# Patient Record
Sex: FEMALE | Race: WHITE | NOT HISPANIC OR LATINO | Employment: OTHER | URBAN - METROPOLITAN AREA
[De-identification: names, ages, dates, MRNs, and addresses within clinical notes are randomized per-mention and may not be internally consistent; named-entity substitution may affect disease eponyms.]

---

## 2017-01-16 ENCOUNTER — COMMUNICATION - HEALTHEAST (OUTPATIENT)
Dept: FAMILY MEDICINE | Facility: CLINIC | Age: 72
End: 2017-01-16

## 2017-05-12 ENCOUNTER — OFFICE VISIT - HEALTHEAST (OUTPATIENT)
Dept: FAMILY MEDICINE | Facility: CLINIC | Age: 72
End: 2017-05-12

## 2017-05-12 DIAGNOSIS — M18.11 DEGENERATIVE ARTHRITIS OF THUMB, RIGHT: ICD-10-CM

## 2017-05-12 DIAGNOSIS — Z01.818 PREOP EXAMINATION: ICD-10-CM

## 2017-05-12 ASSESSMENT — MIFFLIN-ST. JEOR: SCORE: 964.43

## 2017-07-21 ENCOUNTER — HOSPITAL ENCOUNTER (OUTPATIENT)
Dept: MAMMOGRAPHY | Facility: HOSPITAL | Age: 72
Discharge: HOME | End: 2017-07-21
Attending: FAMILY MEDICINE

## 2017-07-21 DIAGNOSIS — Z12.31 VISIT FOR SCREENING MAMMOGRAM: ICD-10-CM

## 2017-07-27 ENCOUNTER — RECORDS - HEALTHEAST (OUTPATIENT)
Dept: ADMINISTRATIVE | Facility: OTHER | Age: 72
End: 2017-07-27

## 2017-08-23 ENCOUNTER — OFFICE VISIT - HEALTHEAST (OUTPATIENT)
Dept: FAMILY MEDICINE | Facility: CLINIC | Age: 72
End: 2017-08-23

## 2017-08-23 DIAGNOSIS — N95.9 MENOPAUSAL AND POSTMENOPAUSAL DISORDER: ICD-10-CM

## 2017-08-23 DIAGNOSIS — G43.909 MIGRAINE HEADACHE: ICD-10-CM

## 2017-08-23 DIAGNOSIS — R79.89 ELEVATED TSH: ICD-10-CM

## 2017-08-23 DIAGNOSIS — E78.00 PURE HYPERCHOLESTEROLEMIA: ICD-10-CM

## 2017-08-23 DIAGNOSIS — Z00.00 ROUTINE GENERAL MEDICAL EXAMINATION AT A HEALTH CARE FACILITY: ICD-10-CM

## 2017-08-23 LAB
CHOLEST SERPL-MCNC: 241 MG/DL
FASTING STATUS PATIENT QL REPORTED: YES
HDLC SERPL-MCNC: 68 MG/DL
LDLC SERPL CALC-MCNC: 139 MG/DL
TRIGL SERPL-MCNC: 172 MG/DL

## 2017-08-23 ASSESSMENT — MIFFLIN-ST. JEOR: SCORE: 953.77

## 2017-08-24 ENCOUNTER — COMMUNICATION - HEALTHEAST (OUTPATIENT)
Dept: FAMILY MEDICINE | Facility: CLINIC | Age: 72
End: 2017-08-24

## 2017-08-26 ENCOUNTER — COMMUNICATION - HEALTHEAST (OUTPATIENT)
Dept: FAMILY MEDICINE | Facility: CLINIC | Age: 72
End: 2017-08-26

## 2017-08-28 ENCOUNTER — RECORDS - HEALTHEAST (OUTPATIENT)
Dept: ADMINISTRATIVE | Facility: OTHER | Age: 72
End: 2017-08-28

## 2017-08-29 ENCOUNTER — COMMUNICATION - HEALTHEAST (OUTPATIENT)
Dept: FAMILY MEDICINE | Facility: CLINIC | Age: 72
End: 2017-08-29

## 2017-09-06 ENCOUNTER — COMMUNICATION - HEALTHEAST (OUTPATIENT)
Dept: FAMILY MEDICINE | Facility: CLINIC | Age: 72
End: 2017-09-06

## 2017-09-14 ENCOUNTER — COMMUNICATION - HEALTHEAST (OUTPATIENT)
Dept: FAMILY MEDICINE | Facility: CLINIC | Age: 72
End: 2017-09-14

## 2017-09-22 ENCOUNTER — AMBULATORY - HEALTHEAST (OUTPATIENT)
Dept: NURSING | Facility: CLINIC | Age: 72
End: 2017-09-22

## 2017-09-22 ENCOUNTER — AMBULATORY - HEALTHEAST (OUTPATIENT)
Dept: FAMILY MEDICINE | Facility: CLINIC | Age: 72
End: 2017-09-22

## 2017-12-14 ENCOUNTER — RECORDS - HEALTHEAST (OUTPATIENT)
Dept: ADMINISTRATIVE | Facility: OTHER | Age: 72
End: 2017-12-14

## 2018-07-11 ENCOUNTER — COMMUNICATION - HEALTHEAST (OUTPATIENT)
Dept: FAMILY MEDICINE | Facility: CLINIC | Age: 73
End: 2018-07-11

## 2018-07-11 ENCOUNTER — AMBULATORY - HEALTHEAST (OUTPATIENT)
Dept: FAMILY MEDICINE | Facility: CLINIC | Age: 73
End: 2018-07-11

## 2018-07-11 DIAGNOSIS — R41.3 MEMORY CHANGES: ICD-10-CM

## 2018-07-27 ENCOUNTER — HOSPITAL ENCOUNTER (OUTPATIENT)
Dept: MAMMOGRAPHY | Facility: CLINIC | Age: 73
Discharge: HOME | End: 2018-07-27
Attending: FAMILY MEDICINE

## 2018-07-27 DIAGNOSIS — Z12.31 VISIT FOR SCREENING MAMMOGRAM: ICD-10-CM

## 2018-07-31 ENCOUNTER — HOSPITAL ENCOUNTER (OUTPATIENT)
Dept: NEUROLOGY | Facility: CLINIC | Age: 73
Setting detail: THERAPIES SERIES
Discharge: STILL ADMITTED OR ADMINISTRATIVE | End: 2018-07-31
Attending: FAMILY MEDICINE

## 2018-07-31 DIAGNOSIS — G31.84 MILD NEUROCOGNITIVE DISORDER: ICD-10-CM

## 2018-07-31 DIAGNOSIS — R41.3 MEMORY CHANGES: ICD-10-CM

## 2018-08-14 ENCOUNTER — HOSPITAL ENCOUNTER (OUTPATIENT)
Dept: NEUROLOGY | Facility: CLINIC | Age: 73
Setting detail: THERAPIES SERIES
Discharge: STILL ADMITTED OR ADMINISTRATIVE | End: 2018-08-14
Attending: FAMILY MEDICINE

## 2018-08-14 DIAGNOSIS — G31.84 MILD NEUROCOGNITIVE DISORDER: ICD-10-CM

## 2018-08-16 ENCOUNTER — COMMUNICATION - HEALTHEAST (OUTPATIENT)
Dept: FAMILY MEDICINE | Facility: CLINIC | Age: 73
End: 2018-08-16

## 2018-08-16 ENCOUNTER — OFFICE VISIT - HEALTHEAST (OUTPATIENT)
Dept: FAMILY MEDICINE | Facility: CLINIC | Age: 73
End: 2018-08-16

## 2018-08-16 DIAGNOSIS — E78.00 PURE HYPERCHOLESTEROLEMIA: ICD-10-CM

## 2018-08-16 DIAGNOSIS — G31.84 MILD NEUROCOGNITIVE DISORDER: ICD-10-CM

## 2018-08-16 DIAGNOSIS — M89.9 DISORDER OF BONE AND CARTILAGE: ICD-10-CM

## 2018-08-16 DIAGNOSIS — M94.9 DISORDER OF BONE AND CARTILAGE: ICD-10-CM

## 2018-08-16 DIAGNOSIS — N95.9 MENOPAUSAL AND POSTMENOPAUSAL DISORDER: ICD-10-CM

## 2018-08-16 DIAGNOSIS — R79.89 ELEVATED TSH: ICD-10-CM

## 2018-08-16 DIAGNOSIS — Z00.00 ROUTINE GENERAL MEDICAL EXAMINATION AT A HEALTH CARE FACILITY: ICD-10-CM

## 2018-08-16 ASSESSMENT — MIFFLIN-ST. JEOR: SCORE: 972.6

## 2018-08-17 ENCOUNTER — AMBULATORY - HEALTHEAST (OUTPATIENT)
Dept: LAB | Facility: CLINIC | Age: 73
End: 2018-08-17

## 2018-08-17 ENCOUNTER — AMBULATORY - HEALTHEAST (OUTPATIENT)
Dept: FAMILY MEDICINE | Facility: CLINIC | Age: 73
End: 2018-08-17

## 2018-08-17 DIAGNOSIS — E78.00 PURE HYPERCHOLESTEROLEMIA: ICD-10-CM

## 2018-08-17 DIAGNOSIS — G31.84 MILD NEUROCOGNITIVE DISORDER: ICD-10-CM

## 2018-08-17 DIAGNOSIS — R79.89 ELEVATED TSH: ICD-10-CM

## 2018-08-17 LAB
ALBUMIN SERPL-MCNC: 3.8 G/DL (ref 3.5–5)
ALP SERPL-CCNC: 55 U/L (ref 45–120)
ALT SERPL W P-5'-P-CCNC: 16 U/L (ref 0–45)
ANION GAP SERPL CALCULATED.3IONS-SCNC: 7 MMOL/L (ref 5–18)
AST SERPL W P-5'-P-CCNC: 19 U/L (ref 0–40)
BILIRUB SERPL-MCNC: 0.5 MG/DL (ref 0–1)
BUN SERPL-MCNC: 18 MG/DL (ref 8–28)
CALCIUM SERPL-MCNC: 9.6 MG/DL (ref 8.5–10.5)
CHLORIDE BLD-SCNC: 109 MMOL/L (ref 98–107)
CHOLEST SERPL-MCNC: 248 MG/DL
CO2 SERPL-SCNC: 25 MMOL/L (ref 22–31)
CREAT SERPL-MCNC: 1.05 MG/DL (ref 0.6–1.1)
ERYTHROCYTE [DISTWIDTH] IN BLOOD BY AUTOMATED COUNT: 11.2 % (ref 11–14.5)
FASTING STATUS PATIENT QL REPORTED: ABNORMAL
FOLATE SERPL-MCNC: 15.6 NG/ML
GFR SERPL CREATININE-BSD FRML MDRD: 51 ML/MIN/1.73M2
GLUCOSE BLD-MCNC: 87 MG/DL (ref 70–125)
HCT VFR BLD AUTO: 43 % (ref 35–47)
HDLC SERPL-MCNC: 69 MG/DL
HGB BLD-MCNC: 14.5 G/DL (ref 12–16)
LDLC SERPL CALC-MCNC: 157 MG/DL
MCH RBC QN AUTO: 30.6 PG (ref 27–34)
MCHC RBC AUTO-ENTMCNC: 33.7 G/DL (ref 32–36)
MCV RBC AUTO: 91 FL (ref 80–100)
PLATELET # BLD AUTO: 321 THOU/UL (ref 140–440)
PMV BLD AUTO: 6.7 FL (ref 7–10)
POTASSIUM BLD-SCNC: 4.4 MMOL/L (ref 3.5–5)
PROT SERPL-MCNC: 6.5 G/DL (ref 6–8)
RBC # BLD AUTO: 4.73 MILL/UL (ref 3.8–5.4)
SODIUM SERPL-SCNC: 141 MMOL/L (ref 136–145)
TRIGL SERPL-MCNC: 109 MG/DL
TSH SERPL DL<=0.005 MIU/L-ACNC: 3.05 UIU/ML (ref 0.3–5)
VIT B12 SERPL-MCNC: 354 PG/ML (ref 213–816)
WBC: 6.1 THOU/UL (ref 4–11)

## 2018-08-18 LAB — T PALLIDUM AB SER QL: NEGATIVE

## 2018-08-19 LAB — B BURGDOR IGG+IGM SER QL: 0.11 INDEX VALUE

## 2018-08-20 ENCOUNTER — COMMUNICATION - HEALTHEAST (OUTPATIENT)
Dept: FAMILY MEDICINE | Facility: CLINIC | Age: 73
End: 2018-08-20

## 2018-08-20 LAB — 25(OH)D3 SERPL-MCNC: 47.5 NG/ML (ref 30–80)

## 2018-08-21 ENCOUNTER — COMMUNICATION - HEALTHEAST (OUTPATIENT)
Dept: FAMILY MEDICINE | Facility: CLINIC | Age: 73
End: 2018-08-21

## 2018-08-22 ENCOUNTER — OFFICE VISIT - HEALTHEAST (OUTPATIENT)
Dept: FAMILY MEDICINE | Facility: CLINIC | Age: 73
End: 2018-08-22

## 2018-08-22 ENCOUNTER — OFFICE VISIT - HEALTHEAST (OUTPATIENT)
Dept: AUDIOLOGY | Facility: CLINIC | Age: 73
End: 2018-08-22

## 2018-08-22 DIAGNOSIS — F41.9 ANXIETY: ICD-10-CM

## 2018-08-22 DIAGNOSIS — G31.84 MILD NEUROCOGNITIVE DISORDER: ICD-10-CM

## 2018-08-22 DIAGNOSIS — H90.3 SENSORINEURAL HEARING LOSS, ASYMMETRICAL: ICD-10-CM

## 2018-08-22 DIAGNOSIS — Z79.899 MEDICATION MANAGEMENT: ICD-10-CM

## 2018-08-23 ENCOUNTER — HOSPITAL ENCOUNTER (OUTPATIENT)
Dept: MRI IMAGING | Facility: HOSPITAL | Age: 73
Discharge: HOME | End: 2018-08-23
Attending: FAMILY MEDICINE

## 2018-08-23 DIAGNOSIS — G31.84 MILD NEUROCOGNITIVE DISORDER: ICD-10-CM

## 2018-08-24 ENCOUNTER — COMMUNICATION - HEALTHEAST (OUTPATIENT)
Dept: FAMILY MEDICINE | Facility: CLINIC | Age: 73
End: 2018-08-24

## 2018-08-28 ENCOUNTER — COMMUNICATION - HEALTHEAST (OUTPATIENT)
Dept: ADMINISTRATIVE | Facility: CLINIC | Age: 73
End: 2018-08-28

## 2018-09-09 ENCOUNTER — COMMUNICATION - HEALTHEAST (OUTPATIENT)
Dept: FAMILY MEDICINE | Facility: CLINIC | Age: 73
End: 2018-09-09

## 2018-09-14 ENCOUNTER — RECORDS - HEALTHEAST (OUTPATIENT)
Dept: ADMINISTRATIVE | Facility: OTHER | Age: 73
End: 2018-09-14

## 2018-09-24 ENCOUNTER — OFFICE VISIT - HEALTHEAST (OUTPATIENT)
Dept: FAMILY MEDICINE | Facility: CLINIC | Age: 73
End: 2018-09-24

## 2018-09-24 DIAGNOSIS — E78.00 PURE HYPERCHOLESTEROLEMIA: ICD-10-CM

## 2018-09-24 DIAGNOSIS — F41.9 ANXIETY: ICD-10-CM

## 2018-09-24 DIAGNOSIS — G31.84 MILD NEUROCOGNITIVE DISORDER: ICD-10-CM

## 2018-09-24 DIAGNOSIS — Z79.899 MEDICATION MANAGEMENT: ICD-10-CM

## 2018-09-25 ENCOUNTER — COMMUNICATION - HEALTHEAST (OUTPATIENT)
Dept: FAMILY MEDICINE | Facility: CLINIC | Age: 73
End: 2018-09-25

## 2018-12-12 ENCOUNTER — HOSPITAL ENCOUNTER (OUTPATIENT)
Dept: NEUROLOGY | Facility: CLINIC | Age: 73
Setting detail: THERAPIES SERIES
Discharge: STILL ADMITTED OR ADMINISTRATIVE | End: 2018-12-12
Attending: FAMILY MEDICINE

## 2018-12-12 DIAGNOSIS — G31.84 MILD NEUROCOGNITIVE DISORDER: ICD-10-CM

## 2018-12-13 ENCOUNTER — AMBULATORY - HEALTHEAST (OUTPATIENT)
Dept: LAB | Facility: CLINIC | Age: 73
End: 2018-12-13

## 2018-12-13 ENCOUNTER — COMMUNICATION - HEALTHEAST (OUTPATIENT)
Dept: FAMILY MEDICINE | Facility: CLINIC | Age: 73
End: 2018-12-13

## 2018-12-13 DIAGNOSIS — E78.00 PURE HYPERCHOLESTEROLEMIA: ICD-10-CM

## 2018-12-13 LAB
ALBUMIN SERPL-MCNC: 3.6 G/DL (ref 3.5–5)
ALP SERPL-CCNC: 55 U/L (ref 45–120)
ALT SERPL W P-5'-P-CCNC: 23 U/L (ref 0–45)
ANION GAP SERPL CALCULATED.3IONS-SCNC: 9 MMOL/L (ref 5–18)
AST SERPL W P-5'-P-CCNC: 26 U/L (ref 0–40)
BILIRUB SERPL-MCNC: 0.7 MG/DL (ref 0–1)
BUN SERPL-MCNC: 14 MG/DL (ref 8–28)
CALCIUM SERPL-MCNC: 9.4 MG/DL (ref 8.5–10.5)
CHLORIDE BLD-SCNC: 108 MMOL/L (ref 98–107)
CHOLEST SERPL-MCNC: 194 MG/DL
CO2 SERPL-SCNC: 24 MMOL/L (ref 22–31)
CREAT SERPL-MCNC: 1 MG/DL (ref 0.6–1.1)
FASTING STATUS PATIENT QL REPORTED: YES
GFR SERPL CREATININE-BSD FRML MDRD: 54 ML/MIN/1.73M2
GLUCOSE BLD-MCNC: 77 MG/DL (ref 70–125)
HDLC SERPL-MCNC: 74 MG/DL
LDLC SERPL CALC-MCNC: 98 MG/DL
POTASSIUM BLD-SCNC: 4.2 MMOL/L (ref 3.5–5)
PROT SERPL-MCNC: 6.2 G/DL (ref 6–8)
SODIUM SERPL-SCNC: 141 MMOL/L (ref 136–145)
TRIGL SERPL-MCNC: 108 MG/DL

## 2018-12-14 ENCOUNTER — OFFICE VISIT - HEALTHEAST (OUTPATIENT)
Dept: FAMILY MEDICINE | Facility: CLINIC | Age: 73
End: 2018-12-14

## 2018-12-14 DIAGNOSIS — E78.00 PURE HYPERCHOLESTEROLEMIA: ICD-10-CM

## 2018-12-14 DIAGNOSIS — Z79.899 MEDICATION MANAGEMENT: ICD-10-CM

## 2018-12-21 ENCOUNTER — HOSPITAL ENCOUNTER (OUTPATIENT)
Dept: NEUROLOGY | Facility: CLINIC | Age: 73
Setting detail: THERAPIES SERIES
Discharge: STILL ADMITTED OR ADMINISTRATIVE | End: 2018-12-21
Attending: FAMILY MEDICINE

## 2018-12-21 ENCOUNTER — COMMUNICATION - HEALTHEAST (OUTPATIENT)
Dept: FAMILY MEDICINE | Facility: CLINIC | Age: 73
End: 2018-12-21

## 2018-12-21 DIAGNOSIS — G31.84 MILD NEUROCOGNITIVE DISORDER: ICD-10-CM

## 2018-12-21 DIAGNOSIS — F02.80 ALZHEIMER DISEASE (H): ICD-10-CM

## 2018-12-21 DIAGNOSIS — G43.909 MIGRAINE HEADACHE: ICD-10-CM

## 2018-12-21 DIAGNOSIS — G30.9 ALZHEIMER DISEASE (H): ICD-10-CM

## 2018-12-29 ENCOUNTER — COMMUNICATION - HEALTHEAST (OUTPATIENT)
Dept: FAMILY MEDICINE | Facility: CLINIC | Age: 73
End: 2018-12-29

## 2018-12-29 DIAGNOSIS — G43.909 MIGRAINE HEADACHE: ICD-10-CM

## 2019-01-02 ENCOUNTER — COMMUNICATION - HEALTHEAST (OUTPATIENT)
Dept: TELEHEALTH | Facility: CLINIC | Age: 74
End: 2019-01-02

## 2019-01-18 ENCOUNTER — RECORDS - HEALTHEAST (OUTPATIENT)
Dept: ADMINISTRATIVE | Facility: OTHER | Age: 74
End: 2019-01-18

## 2019-01-22 ENCOUNTER — RECORDS - HEALTHEAST (OUTPATIENT)
Dept: ADMINISTRATIVE | Facility: OTHER | Age: 74
End: 2019-01-22

## 2019-01-30 ENCOUNTER — COMMUNICATION - HEALTHEAST (OUTPATIENT)
Dept: NEUROLOGY | Facility: CLINIC | Age: 74
End: 2019-01-30

## 2019-01-31 ENCOUNTER — AMBULATORY - HEALTHEAST (OUTPATIENT)
Dept: NEUROLOGY | Facility: CLINIC | Age: 74
End: 2019-01-31

## 2019-04-26 ENCOUNTER — COMMUNICATION - HEALTHEAST (OUTPATIENT)
Dept: FAMILY MEDICINE | Facility: CLINIC | Age: 74
End: 2019-04-26

## 2019-04-26 ENCOUNTER — OFFICE VISIT - HEALTHEAST (OUTPATIENT)
Dept: FAMILY MEDICINE | Facility: CLINIC | Age: 74
End: 2019-04-26

## 2019-04-26 DIAGNOSIS — F41.9 ANXIETY: ICD-10-CM

## 2019-04-26 DIAGNOSIS — R21 RASH AND NONSPECIFIC SKIN ERUPTION: ICD-10-CM

## 2019-04-26 DIAGNOSIS — E78.00 PURE HYPERCHOLESTEROLEMIA: ICD-10-CM

## 2019-04-26 DIAGNOSIS — Z79.899 MEDICATION MANAGEMENT: ICD-10-CM

## 2019-04-26 DIAGNOSIS — G31.84 MILD NEUROCOGNITIVE DISORDER: ICD-10-CM

## 2019-04-26 DIAGNOSIS — R79.89 ELEVATED TSH: ICD-10-CM

## 2019-04-26 DIAGNOSIS — N64.4 BREAST PAIN: ICD-10-CM

## 2019-04-26 LAB — TSH SERPL DL<=0.005 MIU/L-ACNC: 2.29 UIU/ML (ref 0.3–5)

## 2019-04-29 ENCOUNTER — HOSPITAL ENCOUNTER (OUTPATIENT)
Dept: NEUROLOGY | Facility: CLINIC | Age: 74
Setting detail: THERAPIES SERIES
Discharge: STILL ADMITTED OR ADMINISTRATIVE | End: 2019-04-29
Attending: PSYCHIATRY & NEUROLOGY

## 2019-04-29 DIAGNOSIS — G30.1 LATE ONSET ALZHEIMER'S DISEASE WITHOUT BEHAVIORAL DISTURBANCE (H): ICD-10-CM

## 2019-04-29 DIAGNOSIS — F02.80 LATE ONSET ALZHEIMER'S DISEASE WITHOUT BEHAVIORAL DISTURBANCE (H): ICD-10-CM

## 2019-05-07 ENCOUNTER — RECORDS - HEALTHEAST (OUTPATIENT)
Dept: ADMINISTRATIVE | Facility: OTHER | Age: 74
End: 2019-05-07

## 2019-05-09 ENCOUNTER — HOSPITAL ENCOUNTER (OUTPATIENT)
Dept: NEUROLOGY | Facility: CLINIC | Age: 74
Setting detail: THERAPIES SERIES
Discharge: STILL ADMITTED OR ADMINISTRATIVE | End: 2019-05-09
Attending: PSYCHIATRY & NEUROLOGY

## 2019-05-09 ENCOUNTER — HOSPITAL ENCOUNTER (OUTPATIENT)
Dept: OCCUPATIONAL THERAPY | Age: 74
Setting detail: THERAPIES SERIES
Discharge: STILL ADMITTED OR ADMINISTRATIVE | End: 2019-05-09
Attending: PSYCHIATRY & NEUROLOGY

## 2019-05-09 DIAGNOSIS — R41.89 COGNITIVE IMPAIRMENT: ICD-10-CM

## 2019-06-10 ENCOUNTER — COMMUNICATION - HEALTHEAST (OUTPATIENT)
Dept: FAMILY MEDICINE | Facility: CLINIC | Age: 74
End: 2019-06-10

## 2019-06-11 ENCOUNTER — HOSPITAL ENCOUNTER (OUTPATIENT)
Dept: NEUROLOGY | Facility: CLINIC | Age: 74
Setting detail: THERAPIES SERIES
Discharge: STILL ADMITTED OR ADMINISTRATIVE | End: 2019-06-11
Attending: PSYCHIATRY & NEUROLOGY

## 2019-06-11 DIAGNOSIS — F02.80 LATE ONSET ALZHEIMER'S DISEASE WITHOUT BEHAVIORAL DISTURBANCE (H): ICD-10-CM

## 2019-06-11 DIAGNOSIS — G30.1 LATE ONSET ALZHEIMER'S DISEASE WITHOUT BEHAVIORAL DISTURBANCE (H): ICD-10-CM

## 2019-06-21 ENCOUNTER — HOSPITAL ENCOUNTER (OUTPATIENT)
Dept: NEUROLOGY | Facility: CLINIC | Age: 74
Setting detail: THERAPIES SERIES
Discharge: STILL ADMITTED OR ADMINISTRATIVE | End: 2019-06-21
Attending: PSYCHIATRY & NEUROLOGY

## 2019-07-29 ENCOUNTER — HOSPITAL ENCOUNTER (OUTPATIENT)
Dept: MAMMOGRAPHY | Facility: CLINIC | Age: 74
Discharge: HOME | End: 2019-07-29
Attending: FAMILY MEDICINE

## 2019-07-29 DIAGNOSIS — Z12.31 VISIT FOR SCREENING MAMMOGRAM: ICD-10-CM

## 2019-07-31 ENCOUNTER — HOSPITAL ENCOUNTER (OUTPATIENT)
Dept: MAMMOGRAPHY | Facility: CLINIC | Age: 74
Discharge: HOME | End: 2019-07-31
Attending: FAMILY MEDICINE

## 2019-07-31 DIAGNOSIS — N64.89 BREAST ASYMMETRY: ICD-10-CM

## 2019-08-06 ENCOUNTER — COMMUNICATION - HEALTHEAST (OUTPATIENT)
Dept: FAMILY MEDICINE | Facility: CLINIC | Age: 74
End: 2019-08-06

## 2019-08-09 ENCOUNTER — COMMUNICATION - HEALTHEAST (OUTPATIENT)
Dept: FAMILY MEDICINE | Facility: CLINIC | Age: 74
End: 2019-08-09

## 2019-08-09 DIAGNOSIS — F41.9 ANXIETY: ICD-10-CM

## 2019-08-09 DIAGNOSIS — N95.9 MENOPAUSAL AND POSTMENOPAUSAL DISORDER: ICD-10-CM

## 2019-08-15 ENCOUNTER — COMMUNICATION - HEALTHEAST (OUTPATIENT)
Dept: NEUROLOGY | Facility: CLINIC | Age: 74
End: 2019-08-15

## 2019-08-21 ENCOUNTER — OFFICE VISIT - HEALTHEAST (OUTPATIENT)
Dept: FAMILY MEDICINE | Facility: CLINIC | Age: 74
End: 2019-08-21

## 2019-08-21 ENCOUNTER — AMBULATORY - HEALTHEAST (OUTPATIENT)
Dept: FAMILY MEDICINE | Facility: CLINIC | Age: 74
End: 2019-08-21

## 2019-08-21 DIAGNOSIS — E78.00 PURE HYPERCHOLESTEROLEMIA: ICD-10-CM

## 2019-08-21 DIAGNOSIS — R79.89 ELEVATED TSH: ICD-10-CM

## 2019-08-21 DIAGNOSIS — G43.909 MIGRAINE HEADACHE: ICD-10-CM

## 2019-08-21 DIAGNOSIS — H25.9 AGE-RELATED CATARACT OF BOTH EYES, UNSPECIFIED AGE-RELATED CATARACT TYPE: ICD-10-CM

## 2019-08-21 DIAGNOSIS — G31.84 MILD NEUROCOGNITIVE DISORDER: ICD-10-CM

## 2019-08-21 DIAGNOSIS — H90.A22 SENSORINEURAL HEARING LOSS (SNHL) OF LEFT EAR WITH RESTRICTED HEARING OF RIGHT EAR: ICD-10-CM

## 2019-08-21 DIAGNOSIS — E07.9 DISORDER OF THYROID: ICD-10-CM

## 2019-08-21 DIAGNOSIS — N95.9 MENOPAUSAL AND POSTMENOPAUSAL DISORDER: ICD-10-CM

## 2019-08-21 DIAGNOSIS — Z01.818 PREOP GENERAL PHYSICAL EXAM: ICD-10-CM

## 2019-08-21 LAB — TSH SERPL DL<=0.005 MIU/L-ACNC: 2.83 UIU/ML (ref 0.3–5)

## 2019-08-21 ASSESSMENT — MIFFLIN-ST. JEOR: SCORE: 983.14

## 2019-08-22 ENCOUNTER — COMMUNICATION - HEALTHEAST (OUTPATIENT)
Dept: FAMILY MEDICINE | Facility: CLINIC | Age: 74
End: 2019-08-22

## 2019-08-26 ENCOUNTER — COMMUNICATION - HEALTHEAST (OUTPATIENT)
Dept: NEUROLOGY | Facility: CLINIC | Age: 74
End: 2019-08-26

## 2019-09-12 ENCOUNTER — HOSPITAL ENCOUNTER (OUTPATIENT)
Dept: NEUROLOGY | Facility: CLINIC | Age: 74
Setting detail: THERAPIES SERIES
Discharge: STILL ADMITTED OR ADMINISTRATIVE | End: 2019-09-12
Attending: PSYCHIATRY & NEUROLOGY

## 2019-09-12 DIAGNOSIS — G31.84 MILD NEUROCOGNITIVE DISORDER: ICD-10-CM

## 2019-09-23 ENCOUNTER — AMBULATORY - HEALTHEAST (OUTPATIENT)
Dept: FAMILY MEDICINE | Facility: CLINIC | Age: 74
End: 2019-09-23

## 2019-09-23 ENCOUNTER — COMMUNICATION - HEALTHEAST (OUTPATIENT)
Dept: LAB | Facility: CLINIC | Age: 74
End: 2019-09-23

## 2019-09-23 DIAGNOSIS — E78.00 PURE HYPERCHOLESTEROLEMIA: ICD-10-CM

## 2019-09-27 ENCOUNTER — RECORDS - HEALTHEAST (OUTPATIENT)
Dept: ADMINISTRATIVE | Facility: OTHER | Age: 74
End: 2019-09-27

## 2019-09-27 LAB — OCCULT BLOOD (FIT)_EXT (HISTORICAL CONVERSION): NEGATIVE

## 2019-09-30 ENCOUNTER — OFFICE VISIT - HEALTHEAST (OUTPATIENT)
Dept: FAMILY MEDICINE | Facility: CLINIC | Age: 74
End: 2019-09-30

## 2019-09-30 DIAGNOSIS — G31.84 MILD NEUROCOGNITIVE DISORDER: ICD-10-CM

## 2019-09-30 DIAGNOSIS — F41.9 ANXIETY: ICD-10-CM

## 2019-09-30 DIAGNOSIS — Z79.899 MEDICATION MANAGEMENT: ICD-10-CM

## 2019-09-30 DIAGNOSIS — R79.89 ELEVATED TSH: ICD-10-CM

## 2019-09-30 DIAGNOSIS — E78.00 PURE HYPERCHOLESTEROLEMIA: ICD-10-CM

## 2019-09-30 LAB
ALBUMIN SERPL-MCNC: 3.9 G/DL (ref 3.5–5)
ALP SERPL-CCNC: 66 U/L (ref 45–120)
ALT SERPL W P-5'-P-CCNC: 13 U/L (ref 0–45)
ANION GAP SERPL CALCULATED.3IONS-SCNC: 8 MMOL/L (ref 5–18)
AST SERPL W P-5'-P-CCNC: 20 U/L (ref 0–40)
BILIRUB SERPL-MCNC: 0.5 MG/DL (ref 0–1)
BUN SERPL-MCNC: 16 MG/DL (ref 8–28)
CALCIUM SERPL-MCNC: 10 MG/DL (ref 8.5–10.5)
CHLORIDE BLD-SCNC: 109 MMOL/L (ref 98–107)
CHOLEST SERPL-MCNC: 195 MG/DL
CO2 SERPL-SCNC: 24 MMOL/L (ref 22–31)
CREAT SERPL-MCNC: 0.98 MG/DL (ref 0.6–1.1)
FASTING STATUS PATIENT QL REPORTED: YES
GFR SERPL CREATININE-BSD FRML MDRD: 55 ML/MIN/1.73M2
GLUCOSE BLD-MCNC: 79 MG/DL (ref 70–125)
HDLC SERPL-MCNC: 73 MG/DL
LDLC SERPL CALC-MCNC: 97 MG/DL
POTASSIUM BLD-SCNC: 4.4 MMOL/L (ref 3.5–5)
PROT SERPL-MCNC: 6.6 G/DL (ref 6–8)
SODIUM SERPL-SCNC: 141 MMOL/L (ref 136–145)
TRIGL SERPL-MCNC: 125 MG/DL

## 2019-10-02 ENCOUNTER — AMBULATORY - HEALTHEAST (OUTPATIENT)
Dept: FAMILY MEDICINE | Facility: CLINIC | Age: 74
End: 2019-10-02

## 2019-10-02 ENCOUNTER — COMMUNICATION - HEALTHEAST (OUTPATIENT)
Dept: FAMILY MEDICINE | Facility: CLINIC | Age: 74
End: 2019-10-02

## 2019-10-02 DIAGNOSIS — E78.00 PURE HYPERCHOLESTEROLEMIA: ICD-10-CM

## 2019-10-14 ENCOUNTER — RECORDS - HEALTHEAST (OUTPATIENT)
Dept: HEALTH INFORMATION MANAGEMENT | Facility: CLINIC | Age: 74
End: 2019-10-14

## 2019-11-04 ENCOUNTER — COMMUNICATION - HEALTHEAST (OUTPATIENT)
Dept: SCHEDULING | Facility: CLINIC | Age: 74
End: 2019-11-04

## 2020-03-06 ENCOUNTER — OFFICE VISIT - HEALTHEAST (OUTPATIENT)
Dept: FAMILY MEDICINE | Facility: CLINIC | Age: 75
End: 2020-03-06

## 2020-03-06 DIAGNOSIS — J11.1 INFLUENZA-LIKE ILLNESS: ICD-10-CM

## 2020-03-06 DIAGNOSIS — J20.9 ACUTE BRONCHITIS, UNSPECIFIED ORGANISM: ICD-10-CM

## 2020-03-06 LAB
FLUAV AG SPEC QL IA: NORMAL
FLUBV AG SPEC QL IA: NORMAL

## 2020-04-19 ENCOUNTER — COMMUNICATION - HEALTHEAST (OUTPATIENT)
Dept: FAMILY MEDICINE | Facility: CLINIC | Age: 75
End: 2020-04-19

## 2020-04-19 DIAGNOSIS — F41.9 ANXIETY: ICD-10-CM

## 2020-04-19 DIAGNOSIS — N95.9 MENOPAUSAL AND POSTMENOPAUSAL DISORDER: ICD-10-CM

## 2020-05-20 ENCOUNTER — AMBULATORY - HEALTHEAST (OUTPATIENT)
Dept: LAB | Facility: CLINIC | Age: 75
End: 2020-05-20

## 2020-05-20 ENCOUNTER — OFFICE VISIT - HEALTHEAST (OUTPATIENT)
Dept: FAMILY MEDICINE | Facility: CLINIC | Age: 75
End: 2020-05-20

## 2020-05-20 ENCOUNTER — COMMUNICATION - HEALTHEAST (OUTPATIENT)
Dept: FAMILY MEDICINE | Facility: CLINIC | Age: 75
End: 2020-05-20

## 2020-05-20 DIAGNOSIS — M70.61 TROCHANTERIC BURSITIS OF RIGHT HIP: ICD-10-CM

## 2020-05-20 DIAGNOSIS — R41.89 COGNITIVE CHANGE: ICD-10-CM

## 2020-05-20 DIAGNOSIS — R15.9 INCONTINENCE OF FECES, UNSPECIFIED FECAL INCONTINENCE TYPE: ICD-10-CM

## 2020-05-20 DIAGNOSIS — E03.9 ACQUIRED HYPOTHYROIDISM: ICD-10-CM

## 2020-05-20 LAB
ALBUMIN SERPL-MCNC: 3.9 G/DL (ref 3.5–5)
ALP SERPL-CCNC: 62 U/L (ref 45–120)
ALT SERPL W P-5'-P-CCNC: 14 U/L (ref 0–45)
ANION GAP SERPL CALCULATED.3IONS-SCNC: 7 MMOL/L (ref 5–18)
AST SERPL W P-5'-P-CCNC: 19 U/L (ref 0–40)
BILIRUB SERPL-MCNC: 0.5 MG/DL (ref 0–1)
BUN SERPL-MCNC: 18 MG/DL (ref 8–28)
CALCIUM SERPL-MCNC: 10.1 MG/DL (ref 8.5–10.5)
CHLORIDE BLD-SCNC: 107 MMOL/L (ref 98–107)
CO2 SERPL-SCNC: 27 MMOL/L (ref 22–31)
CREAT SERPL-MCNC: 1.01 MG/DL (ref 0.6–1.1)
ERYTHROCYTE [DISTWIDTH] IN BLOOD BY AUTOMATED COUNT: 11.8 % (ref 11–14.5)
ESTRADIOL SERPL-MCNC: 76 PG/ML
GFR SERPL CREATININE-BSD FRML MDRD: 53 ML/MIN/1.73M2
GLUCOSE BLD-MCNC: 83 MG/DL (ref 70–125)
HCT VFR BLD AUTO: 44.7 % (ref 35–47)
HGB BLD-MCNC: 14.8 G/DL (ref 12–16)
MCH RBC QN AUTO: 30.3 PG (ref 27–34)
MCHC RBC AUTO-ENTMCNC: 33.1 G/DL (ref 32–36)
MCV RBC AUTO: 92 FL (ref 80–100)
PLATELET # BLD AUTO: 307 THOU/UL (ref 140–440)
PMV BLD AUTO: 7 FL (ref 7–10)
POTASSIUM BLD-SCNC: 5.1 MMOL/L (ref 3.5–5)
PROGEST SERPL-MCNC: 4.4 NG/ML
PROT SERPL-MCNC: 6.9 G/DL (ref 6–8)
RBC # BLD AUTO: 4.88 MILL/UL (ref 3.8–5.4)
ROTAVIRUS ANTIGEN: NORMAL
SODIUM SERPL-SCNC: 141 MMOL/L (ref 136–145)
T3FREE SERPL-MCNC: 3 PG/ML (ref 1.9–3.9)
T4 FREE SERPL-MCNC: 1 NG/DL (ref 0.7–1.8)
TSH SERPL DL<=0.005 MIU/L-ACNC: 2.58 UIU/ML (ref 0.3–5)
VIT B12 SERPL-MCNC: 361 PG/ML (ref 213–816)
WBC: 5.8 THOU/UL (ref 4–11)

## 2020-05-21 LAB
C PARVUM AG STL QL IA: NORMAL
G LAMBLIA AG STL QL IA: NORMAL
O+P STL MICRO: NORMAL
SHIGA TOXIN 1: NEGATIVE
SHIGA TOXIN 2: NEGATIVE

## 2020-05-22 LAB
B BURGDOR IGG+IGM SER QL: 0.08 INDEX VALUE
H PYLORI AG STL QL IA: NEGATIVE
SHBG SERPL-SCNC: 96 NMOL/L (ref 30–135)
TESTOST FREE SERPL-MCNC: 0.08 NG/DL (ref 0.06–0.38)
TESTOST SERPL-MCNC: 12 NG/DL (ref 8–60)

## 2020-05-23 LAB — BACTERIA SPEC CULT: NORMAL

## 2020-05-26 ENCOUNTER — COMMUNICATION - HEALTHEAST (OUTPATIENT)
Dept: FAMILY MEDICINE | Facility: CLINIC | Age: 75
End: 2020-05-26

## 2020-05-27 ENCOUNTER — OFFICE VISIT - HEALTHEAST (OUTPATIENT)
Dept: FAMILY MEDICINE | Facility: CLINIC | Age: 75
End: 2020-05-27

## 2020-05-27 ENCOUNTER — COMMUNICATION - HEALTHEAST (OUTPATIENT)
Dept: FAMILY MEDICINE | Facility: CLINIC | Age: 75
End: 2020-05-27

## 2020-05-27 DIAGNOSIS — R94.6 THYROID FUNCTION TEST ABNORMAL: ICD-10-CM

## 2020-05-27 DIAGNOSIS — G31.84 MILD NEUROCOGNITIVE DISORDER: ICD-10-CM

## 2020-05-28 ENCOUNTER — AMBULATORY - HEALTHEAST (OUTPATIENT)
Dept: LAB | Facility: CLINIC | Age: 75
End: 2020-05-28

## 2020-05-28 ENCOUNTER — COMMUNICATION - HEALTHEAST (OUTPATIENT)
Dept: SCHEDULING | Facility: CLINIC | Age: 75
End: 2020-05-28

## 2020-05-28 DIAGNOSIS — R15.9 INCONTINENCE OF FECES, UNSPECIFIED FECAL INCONTINENCE TYPE: ICD-10-CM

## 2020-05-29 ENCOUNTER — COMMUNICATION - HEALTHEAST (OUTPATIENT)
Dept: FAMILY MEDICINE | Facility: CLINIC | Age: 75
End: 2020-05-29

## 2020-05-29 ENCOUNTER — COMMUNICATION - HEALTHEAST (OUTPATIENT)
Dept: SCHEDULING | Facility: CLINIC | Age: 75
End: 2020-05-29

## 2020-05-29 LAB
RESULT: NEGATIVE
SPECIMEN SOURCE: NORMAL

## 2020-05-31 ENCOUNTER — SURGERY - HEALTHEAST (OUTPATIENT)
Dept: CARDIOLOGY | Facility: CLINIC | Age: 75
End: 2020-05-31

## 2020-06-01 ENCOUNTER — AMBULATORY - HEALTHEAST (OUTPATIENT)
Dept: CARDIOLOGY | Facility: CLINIC | Age: 75
End: 2020-06-01

## 2020-06-01 ENCOUNTER — COMMUNICATION - HEALTHEAST (OUTPATIENT)
Dept: FAMILY MEDICINE | Facility: CLINIC | Age: 75
End: 2020-06-01

## 2020-06-01 ENCOUNTER — COMMUNICATION - HEALTHEAST (OUTPATIENT)
Dept: CARDIOLOGY | Facility: CLINIC | Age: 75
End: 2020-06-01

## 2020-06-01 DIAGNOSIS — Z95.0 CARDIAC PACEMAKER IN SITU: ICD-10-CM

## 2020-06-01 DIAGNOSIS — I49.5 SICK SINUS SYNDROME (H): ICD-10-CM

## 2020-06-01 LAB
HCC DEVICE COMMENTS: NORMAL
HCC DEVICE IMPLANTING PROVIDER: NORMAL
HCC DEVICE MANUFACTURE: NORMAL
HCC DEVICE MODEL: NORMAL
HCC DEVICE SERIAL NUMBER: NORMAL
HCC DEVICE TYPE: NORMAL

## 2020-06-02 ENCOUNTER — COMMUNICATION - HEALTHEAST (OUTPATIENT)
Dept: CARDIOLOGY | Facility: CLINIC | Age: 75
End: 2020-06-02

## 2020-06-03 ENCOUNTER — COMMUNICATION - HEALTHEAST (OUTPATIENT)
Dept: NURSING | Facility: CLINIC | Age: 75
End: 2020-06-03

## 2020-06-07 ENCOUNTER — AMBULATORY - HEALTHEAST (OUTPATIENT)
Dept: CARDIOLOGY | Facility: CLINIC | Age: 75
End: 2020-06-07

## 2020-06-07 DIAGNOSIS — Z95.0 CARDIAC PACEMAKER IN SITU: ICD-10-CM

## 2020-06-07 DIAGNOSIS — I49.5 SICK SINUS SYNDROME (H): ICD-10-CM

## 2020-06-08 ENCOUNTER — OFFICE VISIT - HEALTHEAST (OUTPATIENT)
Dept: FAMILY MEDICINE | Facility: CLINIC | Age: 75
End: 2020-06-08

## 2020-06-08 ENCOUNTER — COMMUNICATION - HEALTHEAST (OUTPATIENT)
Dept: FAMILY MEDICINE | Facility: CLINIC | Age: 75
End: 2020-06-08

## 2020-06-08 DIAGNOSIS — E03.9 HYPOTHYROIDISM, UNSPECIFIED TYPE: ICD-10-CM

## 2020-06-08 DIAGNOSIS — R19.8 ANAL DISCHARGE: ICD-10-CM

## 2020-06-08 DIAGNOSIS — F03.90 DEMENTIA WITHOUT BEHAVIORAL DISTURBANCE, UNSPECIFIED DEMENTIA TYPE: ICD-10-CM

## 2020-06-08 DIAGNOSIS — M70.61 TROCHANTERIC BURSITIS OF RIGHT HIP: ICD-10-CM

## 2020-06-08 DIAGNOSIS — Z09 HOSPITAL DISCHARGE FOLLOW-UP: ICD-10-CM

## 2020-06-08 ASSESSMENT — PATIENT HEALTH QUESTIONNAIRE - PHQ9: SUM OF ALL RESPONSES TO PHQ QUESTIONS 1-9: 7

## 2020-06-09 ENCOUNTER — OFFICE VISIT - HEALTHEAST (OUTPATIENT)
Dept: CARDIOLOGY | Facility: CLINIC | Age: 75
End: 2020-06-09

## 2020-06-09 ENCOUNTER — COMMUNICATION - HEALTHEAST (OUTPATIENT)
Dept: FAMILY MEDICINE | Facility: CLINIC | Age: 75
End: 2020-06-09

## 2020-06-09 DIAGNOSIS — I45.5 SINUS PAUSE: ICD-10-CM

## 2020-06-09 DIAGNOSIS — I45.9 HEART BLOCK: ICD-10-CM

## 2020-06-09 DIAGNOSIS — Z95.0 CARDIAC PACEMAKER IN SITU: ICD-10-CM

## 2020-06-09 DIAGNOSIS — I49.5 SICK SINUS SYNDROME (H): ICD-10-CM

## 2020-06-20 ENCOUNTER — COMMUNICATION - HEALTHEAST (OUTPATIENT)
Dept: FAMILY MEDICINE | Facility: CLINIC | Age: 75
End: 2020-06-20

## 2020-06-20 DIAGNOSIS — R79.89 ELEVATED TSH: ICD-10-CM

## 2020-06-24 ENCOUNTER — COMMUNICATION - HEALTHEAST (OUTPATIENT)
Dept: FAMILY MEDICINE | Facility: CLINIC | Age: 75
End: 2020-06-24

## 2020-06-24 ENCOUNTER — RECORDS - HEALTHEAST (OUTPATIENT)
Dept: ADMINISTRATIVE | Facility: OTHER | Age: 75
End: 2020-06-24

## 2020-06-28 ENCOUNTER — COMMUNICATION - HEALTHEAST (OUTPATIENT)
Dept: FAMILY MEDICINE | Facility: CLINIC | Age: 75
End: 2020-06-28

## 2020-06-29 ENCOUNTER — COMMUNICATION - HEALTHEAST (OUTPATIENT)
Dept: CARDIOLOGY | Facility: CLINIC | Age: 75
End: 2020-06-29

## 2020-07-01 ENCOUNTER — OFFICE VISIT - HEALTHEAST (OUTPATIENT)
Dept: FAMILY MEDICINE | Facility: CLINIC | Age: 75
End: 2020-07-01

## 2020-07-01 ENCOUNTER — AMBULATORY - HEALTHEAST (OUTPATIENT)
Dept: CARDIOLOGY | Facility: CLINIC | Age: 75
End: 2020-07-01

## 2020-07-01 ENCOUNTER — COMMUNICATION - HEALTHEAST (OUTPATIENT)
Dept: FAMILY MEDICINE | Facility: CLINIC | Age: 75
End: 2020-07-01

## 2020-07-01 DIAGNOSIS — R06.02 SOB (SHORTNESS OF BREATH): ICD-10-CM

## 2020-07-01 DIAGNOSIS — R07.1 CHEST PAIN ON BREATHING: ICD-10-CM

## 2020-07-10 ENCOUNTER — OFFICE VISIT - HEALTHEAST (OUTPATIENT)
Dept: FAMILY MEDICINE | Facility: CLINIC | Age: 75
End: 2020-07-10

## 2020-07-10 DIAGNOSIS — M89.9 DISORDER OF BONE AND CARTILAGE: ICD-10-CM

## 2020-07-10 DIAGNOSIS — G31.84 MILD NEUROCOGNITIVE DISORDER: ICD-10-CM

## 2020-07-10 DIAGNOSIS — Z95.0 CARDIAC PACEMAKER IN SITU: ICD-10-CM

## 2020-07-10 DIAGNOSIS — E78.00 PURE HYPERCHOLESTEROLEMIA: ICD-10-CM

## 2020-07-10 DIAGNOSIS — I45.9 HEART BLOCK: ICD-10-CM

## 2020-07-10 DIAGNOSIS — M94.9 DISORDER OF BONE AND CARTILAGE: ICD-10-CM

## 2020-07-10 DIAGNOSIS — I45.5 SINUS PAUSE: ICD-10-CM

## 2020-07-10 DIAGNOSIS — E03.9 HYPOTHYROIDISM, UNSPECIFIED TYPE: ICD-10-CM

## 2020-07-10 DIAGNOSIS — I49.5 SICK SINUS SYNDROME (H): ICD-10-CM

## 2020-07-10 DIAGNOSIS — H90.A22 SENSORINEURAL HEARING LOSS (SNHL) OF LEFT EAR WITH RESTRICTED HEARING OF RIGHT EAR: ICD-10-CM

## 2020-07-15 ENCOUNTER — HOSPITAL ENCOUNTER (OUTPATIENT)
Dept: MAMMOGRAPHY | Facility: CLINIC | Age: 75
Discharge: HOME | End: 2020-07-15
Attending: FAMILY MEDICINE

## 2020-07-15 DIAGNOSIS — Z12.31 SCREENING MAMMOGRAM, ENCOUNTER FOR: ICD-10-CM

## 2020-07-20 ENCOUNTER — RECORDS - HEALTHEAST (OUTPATIENT)
Dept: ADMINISTRATIVE | Facility: OTHER | Age: 75
End: 2020-07-20

## 2020-08-03 ENCOUNTER — COMMUNICATION - HEALTHEAST (OUTPATIENT)
Dept: CARDIOLOGY | Facility: CLINIC | Age: 75
End: 2020-08-03

## 2020-08-04 ENCOUNTER — AMBULATORY - HEALTHEAST (OUTPATIENT)
Dept: CARDIOLOGY | Facility: CLINIC | Age: 75
End: 2020-08-04

## 2020-08-04 ENCOUNTER — RECORDS - HEALTHEAST (OUTPATIENT)
Dept: ADMINISTRATIVE | Facility: OTHER | Age: 75
End: 2020-08-04

## 2020-08-04 DIAGNOSIS — I49.5 SICK SINUS SYNDROME (H): ICD-10-CM

## 2020-08-04 DIAGNOSIS — Z95.0 CARDIAC PACEMAKER IN SITU: ICD-10-CM

## 2020-08-04 ASSESSMENT — MIFFLIN-ST. JEOR: SCORE: 996.64

## 2020-08-10 ENCOUNTER — COMMUNICATION - HEALTHEAST (OUTPATIENT)
Dept: FAMILY MEDICINE | Facility: CLINIC | Age: 75
End: 2020-08-10

## 2020-08-11 ENCOUNTER — COMMUNICATION - HEALTHEAST (OUTPATIENT)
Dept: FAMILY MEDICINE | Facility: CLINIC | Age: 75
End: 2020-08-11

## 2020-10-12 ENCOUNTER — OFFICE VISIT - HEALTHEAST (OUTPATIENT)
Dept: FAMILY MEDICINE | Facility: CLINIC | Age: 75
End: 2020-10-12

## 2020-10-12 DIAGNOSIS — E03.9 HYPOTHYROIDISM, UNSPECIFIED TYPE: ICD-10-CM

## 2020-10-12 DIAGNOSIS — G43.909 MIGRAINE WITHOUT STATUS MIGRAINOSUS, NOT INTRACTABLE, UNSPECIFIED MIGRAINE TYPE: ICD-10-CM

## 2020-10-12 DIAGNOSIS — E78.00 PURE HYPERCHOLESTEROLEMIA: ICD-10-CM

## 2020-10-12 DIAGNOSIS — H90.A22 SENSORINEURAL HEARING LOSS (SNHL) OF LEFT EAR WITH RESTRICTED HEARING OF RIGHT EAR: ICD-10-CM

## 2020-10-12 DIAGNOSIS — Z23 ENCOUNTER FOR ADMINISTRATION OF VACCINE: ICD-10-CM

## 2020-10-12 DIAGNOSIS — G31.84 MILD NEUROCOGNITIVE DISORDER: ICD-10-CM

## 2020-10-12 DIAGNOSIS — F41.9 ANXIETY: ICD-10-CM

## 2020-10-12 DIAGNOSIS — I45.9 HEART BLOCK: ICD-10-CM

## 2020-10-12 DIAGNOSIS — Z00.01 ENCOUNTER FOR GENERAL ADULT MEDICAL EXAMINATION WITH ABNORMAL FINDINGS: ICD-10-CM

## 2020-10-12 DIAGNOSIS — Z95.0 CARDIAC PACEMAKER IN SITU: ICD-10-CM

## 2020-10-12 DIAGNOSIS — I49.5 SICK SINUS SYNDROME (H): ICD-10-CM

## 2020-10-12 DIAGNOSIS — G43.909 MIGRAINE HEADACHE: ICD-10-CM

## 2020-10-12 DIAGNOSIS — N95.9 MENOPAUSAL AND POSTMENOPAUSAL DISORDER: ICD-10-CM

## 2020-10-12 LAB
ALBUMIN SERPL-MCNC: 4 G/DL (ref 3.5–5)
ALP SERPL-CCNC: 55 U/L (ref 45–120)
ALT SERPL W P-5'-P-CCNC: 16 U/L (ref 0–45)
ANION GAP SERPL CALCULATED.3IONS-SCNC: 8 MMOL/L (ref 5–18)
AST SERPL W P-5'-P-CCNC: 19 U/L (ref 0–40)
BILIRUB SERPL-MCNC: 0.6 MG/DL (ref 0–1)
BUN SERPL-MCNC: 16 MG/DL (ref 8–28)
CALCIUM SERPL-MCNC: 9.4 MG/DL (ref 8.5–10.5)
CHLORIDE BLD-SCNC: 108 MMOL/L (ref 98–107)
CHOLEST SERPL-MCNC: 203 MG/DL
CO2 SERPL-SCNC: 25 MMOL/L (ref 22–31)
CREAT SERPL-MCNC: 1.01 MG/DL (ref 0.6–1.1)
FASTING STATUS PATIENT QL REPORTED: YES
GFR SERPL CREATININE-BSD FRML MDRD: 53 ML/MIN/1.73M2
GLUCOSE BLD-MCNC: 90 MG/DL (ref 70–125)
HDLC SERPL-MCNC: 66 MG/DL
LDLC SERPL CALC-MCNC: 110 MG/DL
POTASSIUM BLD-SCNC: 4.4 MMOL/L (ref 3.5–5)
PROT SERPL-MCNC: 6.8 G/DL (ref 6–8)
SODIUM SERPL-SCNC: 141 MMOL/L (ref 136–145)
T3FREE SERPL-MCNC: 2.6 PG/ML (ref 1.9–3.9)
T4 FREE SERPL-MCNC: 1.2 NG/DL (ref 0.7–1.8)
TRIGL SERPL-MCNC: 133 MG/DL
TSH SERPL DL<=0.005 MIU/L-ACNC: 4.18 UIU/ML (ref 0.3–5)

## 2020-10-12 ASSESSMENT — MIFFLIN-ST. JEOR: SCORE: 990.51

## 2020-10-14 ENCOUNTER — COMMUNICATION - HEALTHEAST (OUTPATIENT)
Dept: FAMILY MEDICINE | Facility: CLINIC | Age: 75
End: 2020-10-14

## 2020-10-14 ENCOUNTER — AMBULATORY - HEALTHEAST (OUTPATIENT)
Dept: FAMILY MEDICINE | Facility: CLINIC | Age: 75
End: 2020-10-14

## 2020-10-14 DIAGNOSIS — R79.89 ELEVATED TSH: ICD-10-CM

## 2020-11-02 ENCOUNTER — AMBULATORY - HEALTHEAST (OUTPATIENT)
Dept: CARDIOLOGY | Facility: CLINIC | Age: 75
End: 2020-11-02

## 2020-11-02 DIAGNOSIS — Z95.0 CARDIAC PACEMAKER IN SITU: ICD-10-CM

## 2020-11-02 DIAGNOSIS — I49.5 SICK SINUS SYNDROME (H): ICD-10-CM

## 2020-11-19 ENCOUNTER — OFFICE VISIT - HEALTHEAST (OUTPATIENT)
Dept: FAMILY MEDICINE | Facility: CLINIC | Age: 75
End: 2020-11-19

## 2020-11-19 DIAGNOSIS — H66.002 NON-RECURRENT ACUTE SUPPURATIVE OTITIS MEDIA OF LEFT EAR WITHOUT SPONTANEOUS RUPTURE OF TYMPANIC MEMBRANE: ICD-10-CM

## 2020-11-24 ENCOUNTER — RECORDS - HEALTHEAST (OUTPATIENT)
Dept: ADMINISTRATIVE | Facility: OTHER | Age: 75
End: 2020-11-24

## 2020-12-01 ENCOUNTER — RECORDS - HEALTHEAST (OUTPATIENT)
Dept: ADMINISTRATIVE | Facility: OTHER | Age: 75
End: 2020-12-01

## 2021-01-01 ENCOUNTER — LAB REQUISITION (OUTPATIENT)
Dept: LAB | Facility: CLINIC | Age: 76
End: 2021-01-01
Payer: COMMERCIAL

## 2021-01-01 ENCOUNTER — TRANSFERRED RECORDS (OUTPATIENT)
Dept: HEALTH INFORMATION MANAGEMENT | Facility: CLINIC | Age: 76
End: 2021-01-01
Payer: COMMERCIAL

## 2021-01-01 ENCOUNTER — TRANSFERRED RECORDS (OUTPATIENT)
Dept: HEALTH INFORMATION MANAGEMENT | Facility: CLINIC | Age: 76
End: 2021-01-01

## 2021-01-01 ENCOUNTER — ANCILLARY PROCEDURE (OUTPATIENT)
Dept: CARDIOLOGY | Facility: CLINIC | Age: 76
End: 2021-01-01
Attending: INTERNAL MEDICINE
Payer: COMMERCIAL

## 2021-01-01 ENCOUNTER — APPOINTMENT (OUTPATIENT)
Dept: RADIOLOGY | Facility: CLINIC | Age: 76
End: 2021-01-01
Payer: COMMERCIAL

## 2021-01-01 ENCOUNTER — ANCILLARY PROCEDURE (OUTPATIENT)
Dept: MAMMOGRAPHY | Facility: CLINIC | Age: 76
End: 2021-01-01
Attending: FAMILY MEDICINE
Payer: COMMERCIAL

## 2021-01-01 ENCOUNTER — OFFICE VISIT (OUTPATIENT)
Dept: FAMILY MEDICINE | Facility: CLINIC | Age: 76
End: 2021-01-01
Payer: COMMERCIAL

## 2021-01-01 ENCOUNTER — HEALTH MAINTENANCE LETTER (OUTPATIENT)
Age: 76
End: 2021-01-01

## 2021-01-01 ENCOUNTER — HOSPITAL ENCOUNTER (EMERGENCY)
Facility: CLINIC | Age: 76
Discharge: HOME | End: 2021-10-10
Admitting: PHYSICIAN ASSISTANT
Payer: COMMERCIAL

## 2021-01-01 ENCOUNTER — RECORDS - HEALTHEAST (OUTPATIENT)
Dept: FAMILY MEDICINE | Facility: CLINIC | Age: 76
End: 2021-01-01

## 2021-01-01 VITALS
SYSTOLIC BLOOD PRESSURE: 128 MMHG | WEIGHT: 124 LBS | DIASTOLIC BLOOD PRESSURE: 67 MMHG | HEART RATE: 66 BPM | TEMPERATURE: 98.1 F | RESPIRATION RATE: 18 BRPM | OXYGEN SATURATION: 96 % | BODY MASS INDEX: 22.68 KG/M2

## 2021-01-01 VITALS
HEART RATE: 85 BPM | WEIGHT: 124 LBS | OXYGEN SATURATION: 98 % | BODY MASS INDEX: 22.68 KG/M2 | TEMPERATURE: 97.8 F | RESPIRATION RATE: 18 BRPM | DIASTOLIC BLOOD PRESSURE: 83 MMHG | SYSTOLIC BLOOD PRESSURE: 141 MMHG

## 2021-01-01 DIAGNOSIS — Z78.0 MENOPAUSE: ICD-10-CM

## 2021-01-01 DIAGNOSIS — Z20.822 CONTACT WITH AND (SUSPECTED) EXPOSURE TO COVID-19: ICD-10-CM

## 2021-01-01 DIAGNOSIS — H66.002 NON-RECURRENT ACUTE SUPPURATIVE OTITIS MEDIA OF LEFT EAR WITHOUT SPONTANEOUS RUPTURE OF TYMPANIC MEMBRANE: Primary | ICD-10-CM

## 2021-01-01 DIAGNOSIS — Z95.0 PACEMAKER: ICD-10-CM

## 2021-01-01 DIAGNOSIS — F02.81 ALZHEIMER'S DEMENTIA WITH BEHAVIORAL DISTURBANCE, UNSPECIFIED TIMING OF DEMENTIA ONSET: ICD-10-CM

## 2021-01-01 DIAGNOSIS — Z95.0 CARDIAC PACEMAKER IN SITU: ICD-10-CM

## 2021-01-01 DIAGNOSIS — R00.0 TACHYCARDIA: ICD-10-CM

## 2021-01-01 DIAGNOSIS — I49.5 SICK SINUS SYNDROME (H): ICD-10-CM

## 2021-01-01 DIAGNOSIS — F13.939 BENZODIAZEPINE WITHDRAWAL (H): ICD-10-CM

## 2021-01-01 DIAGNOSIS — R41.82 ALTERED MENTAL STATUS, UNSPECIFIED ALTERED MENTAL STATUS TYPE: Primary | ICD-10-CM

## 2021-01-01 DIAGNOSIS — G30.9 ALZHEIMER'S DEMENTIA WITH BEHAVIORAL DISTURBANCE, UNSPECIFIED TIMING OF DEMENTIA ONSET: ICD-10-CM

## 2021-01-01 DIAGNOSIS — R79.89 ELEVATED TSH: ICD-10-CM

## 2021-01-01 DIAGNOSIS — Z12.31 OTHER SCREENING MAMMOGRAM: ICD-10-CM

## 2021-01-01 DIAGNOSIS — D52.9 FOLATE DEFICIENCY ANEMIA, UNSPECIFIED: ICD-10-CM

## 2021-01-01 DIAGNOSIS — Z12.31 VISIT FOR SCREENING MAMMOGRAM: ICD-10-CM

## 2021-01-01 DIAGNOSIS — E03.9 HYPOTHYROIDISM, UNSPECIFIED: ICD-10-CM

## 2021-01-01 DIAGNOSIS — F41.9 ANXIETY: ICD-10-CM

## 2021-01-01 LAB
ALBUMIN SERPL-MCNC: 3.3 G/DL (ref 3.5–5)
ALBUMIN SERPL-MCNC: 3.7 G/DL (ref 3.5–5)
ALBUMIN UR-MCNC: 50 MG/DL
ALBUMIN UR-MCNC: NEGATIVE MG/DL
ALP SERPL-CCNC: 59 U/L (ref 45–120)
ALP SERPL-CCNC: 78 U/L (ref 45–120)
ALT SERPL W P-5'-P-CCNC: 14 U/L (ref 0–45)
ALT SERPL W P-5'-P-CCNC: 14 U/L (ref 0–45)
ANION GAP SERPL CALCULATED.3IONS-SCNC: 10 MMOL/L (ref 5–18)
ANION GAP SERPL CALCULATED.3IONS-SCNC: 12 MMOL/L (ref 5–18)
APPEARANCE UR: ABNORMAL
APPEARANCE UR: CLEAR
AST SERPL W P-5'-P-CCNC: 18 U/L (ref 0–40)
AST SERPL W P-5'-P-CCNC: 20 U/L (ref 0–40)
ATRIAL RATE - MUSE: 81 BPM
BACTERIA #/AREA URNS HPF: ABNORMAL /HPF
BACTERIA UR CULT: NO GROWTH
BASOPHILS # BLD AUTO: 0.1 10E3/UL (ref 0–0.2)
BASOPHILS NFR BLD AUTO: 1 %
BILIRUB SERPL-MCNC: 0.2 MG/DL (ref 0–1)
BILIRUB SERPL-MCNC: 0.4 MG/DL (ref 0–1)
BILIRUB UR QL STRIP: NEGATIVE
BILIRUB UR QL STRIP: NEGATIVE
BUN SERPL-MCNC: 15 MG/DL (ref 8–28)
BUN SERPL-MCNC: 15 MG/DL (ref 8–28)
CALCIUM SERPL-MCNC: 9.5 MG/DL (ref 8.5–10.5)
CALCIUM SERPL-MCNC: 9.7 MG/DL (ref 8.5–10.5)
CAOX CRY #/AREA URNS HPF: ABNORMAL /HPF
CHLORIDE BLD-SCNC: 105 MMOL/L (ref 98–107)
CHLORIDE BLD-SCNC: 106 MMOL/L (ref 98–107)
CO2 SERPL-SCNC: 21 MMOL/L (ref 22–31)
CO2 SERPL-SCNC: 24 MMOL/L (ref 22–31)
COLOR UR AUTO: COLORLESS
COLOR UR AUTO: YELLOW
CREAT SERPL-MCNC: 1.01 MG/DL (ref 0.6–1.1)
CREAT SERPL-MCNC: 1.1 MG/DL (ref 0.6–1.1)
DIASTOLIC BLOOD PRESSURE - MUSE: NORMAL MMHG
EOSINOPHIL # BLD AUTO: 0.3 10E3/UL (ref 0–0.7)
EOSINOPHIL NFR BLD AUTO: 4 %
ERYTHROCYTE [DISTWIDTH] IN BLOOD BY AUTOMATED COUNT: 12.6 % (ref 10–15)
FOLATE SERPL-MCNC: >20 NG/ML
GFR SERPL CREATININE-BSD FRML MDRD: 49 ML/MIN/1.73M2
GFR SERPL CREATININE-BSD FRML MDRD: 54 ML/MIN/1.73M2
GLUCOSE BLD-MCNC: 121 MG/DL (ref 70–125)
GLUCOSE BLD-MCNC: 91 MG/DL (ref 70–125)
GLUCOSE UR STRIP-MCNC: NEGATIVE MG/DL
GLUCOSE UR STRIP-MCNC: NEGATIVE MG/DL
HCT VFR BLD AUTO: 39.9 % (ref 35–47)
HGB BLD-MCNC: 13.4 G/DL (ref 11.7–15.7)
HGB UR QL STRIP: NEGATIVE
HGB UR QL STRIP: NEGATIVE
IMM GRANULOCYTES # BLD: 0 10E3/UL
IMM GRANULOCYTES NFR BLD: 0 %
INTERPRETATION ECG - MUSE: NORMAL
KETONES UR STRIP-MCNC: 10 MG/DL
KETONES UR STRIP-MCNC: NEGATIVE MG/DL
LEUKOCYTE ESTERASE UR QL STRIP: NEGATIVE
LEUKOCYTE ESTERASE UR QL STRIP: NEGATIVE
LYMPHOCYTES # BLD AUTO: 2.1 10E3/UL (ref 0.8–5.3)
LYMPHOCYTES NFR BLD AUTO: 30 %
MCH RBC QN AUTO: 30.2 PG (ref 26.5–33)
MCHC RBC AUTO-ENTMCNC: 33.6 G/DL (ref 31.5–36.5)
MCV RBC AUTO: 90 FL (ref 78–100)
MDC_IDC_EPISODE_DTM: NORMAL
MDC_IDC_EPISODE_DURATION: 0 S
MDC_IDC_EPISODE_DURATION: 1 S
MDC_IDC_EPISODE_DURATION: 3 S
MDC_IDC_EPISODE_ID: 10
MDC_IDC_EPISODE_ID: 11
MDC_IDC_EPISODE_ID: 12
MDC_IDC_EPISODE_ID: 13
MDC_IDC_EPISODE_ID: 14
MDC_IDC_EPISODE_ID: 15
MDC_IDC_EPISODE_ID: 16
MDC_IDC_EPISODE_ID: 7
MDC_IDC_EPISODE_ID: 8
MDC_IDC_EPISODE_ID: 9
MDC_IDC_EPISODE_TYPE: NORMAL
MDC_IDC_LEAD_IMPLANT_DT: NORMAL
MDC_IDC_LEAD_LOCATION: NORMAL
MDC_IDC_LEAD_LOCATION_DETAIL_1: NORMAL
MDC_IDC_LEAD_MFG: NORMAL
MDC_IDC_LEAD_MODEL: NORMAL
MDC_IDC_LEAD_POLARITY_TYPE: NORMAL
MDC_IDC_LEAD_SERIAL: NORMAL
MDC_IDC_LEAD_SPECIAL_FUNCTION: NORMAL
MDC_IDC_MSMT_BATTERY_DTM: NORMAL
MDC_IDC_MSMT_BATTERY_DTM: NORMAL
MDC_IDC_MSMT_BATTERY_REMAINING_LONGEVITY: 165 MO
MDC_IDC_MSMT_BATTERY_REMAINING_LONGEVITY: 168 MO
MDC_IDC_MSMT_BATTERY_RRT_TRIGGER: 2.62
MDC_IDC_MSMT_BATTERY_RRT_TRIGGER: 2.62
MDC_IDC_MSMT_BATTERY_STATUS: NORMAL
MDC_IDC_MSMT_BATTERY_STATUS: NORMAL
MDC_IDC_MSMT_BATTERY_VOLTAGE: 3.05 V
MDC_IDC_MSMT_BATTERY_VOLTAGE: 3.07 V
MDC_IDC_MSMT_LEADCHNL_RA_IMPEDANCE_VALUE: 361 OHM
MDC_IDC_MSMT_LEADCHNL_RA_IMPEDANCE_VALUE: 361 OHM
MDC_IDC_MSMT_LEADCHNL_RA_IMPEDANCE_VALUE: 418 OHM
MDC_IDC_MSMT_LEADCHNL_RA_IMPEDANCE_VALUE: 418 OHM
MDC_IDC_MSMT_LEADCHNL_RA_PACING_THRESHOLD_AMPLITUDE: 0.75 V
MDC_IDC_MSMT_LEADCHNL_RA_PACING_THRESHOLD_AMPLITUDE: 0.75 V
MDC_IDC_MSMT_LEADCHNL_RA_PACING_THRESHOLD_PULSEWIDTH: 0.4 MS
MDC_IDC_MSMT_LEADCHNL_RA_PACING_THRESHOLD_PULSEWIDTH: 0.4 MS
MDC_IDC_MSMT_LEADCHNL_RA_SENSING_INTR_AMPL: 2.62 MV
MDC_IDC_MSMT_LEADCHNL_RA_SENSING_INTR_AMPL: 2.62 MV
MDC_IDC_MSMT_LEADCHNL_RA_SENSING_INTR_AMPL: 2.88 MV
MDC_IDC_MSMT_LEADCHNL_RA_SENSING_INTR_AMPL: 2.88 MV
MDC_IDC_MSMT_LEADCHNL_RV_IMPEDANCE_VALUE: 418 OHM
MDC_IDC_MSMT_LEADCHNL_RV_IMPEDANCE_VALUE: 437 OHM
MDC_IDC_MSMT_LEADCHNL_RV_IMPEDANCE_VALUE: 475 OHM
MDC_IDC_MSMT_LEADCHNL_RV_IMPEDANCE_VALUE: 494 OHM
MDC_IDC_MSMT_LEADCHNL_RV_PACING_THRESHOLD_AMPLITUDE: 0.62 V
MDC_IDC_MSMT_LEADCHNL_RV_PACING_THRESHOLD_AMPLITUDE: 0.62 V
MDC_IDC_MSMT_LEADCHNL_RV_PACING_THRESHOLD_PULSEWIDTH: 0.4 MS
MDC_IDC_MSMT_LEADCHNL_RV_PACING_THRESHOLD_PULSEWIDTH: 0.4 MS
MDC_IDC_MSMT_LEADCHNL_RV_SENSING_INTR_AMPL: 16.75 MV
MDC_IDC_MSMT_LEADCHNL_RV_SENSING_INTR_AMPL: 16.75 MV
MDC_IDC_MSMT_LEADCHNL_RV_SENSING_INTR_AMPL: 22.5 MV
MDC_IDC_MSMT_LEADCHNL_RV_SENSING_INTR_AMPL: 22.5 MV
MDC_IDC_PG_IMPLANT_DTM: NORMAL
MDC_IDC_PG_IMPLANT_DTM: NORMAL
MDC_IDC_PG_MFG: NORMAL
MDC_IDC_PG_MFG: NORMAL
MDC_IDC_PG_MODEL: NORMAL
MDC_IDC_PG_MODEL: NORMAL
MDC_IDC_PG_SERIAL: NORMAL
MDC_IDC_PG_SERIAL: NORMAL
MDC_IDC_PG_TYPE: NORMAL
MDC_IDC_PG_TYPE: NORMAL
MDC_IDC_SESS_CLINIC_NAME: NORMAL
MDC_IDC_SESS_CLINIC_NAME: NORMAL
MDC_IDC_SESS_DTM: NORMAL
MDC_IDC_SESS_DTM: NORMAL
MDC_IDC_SESS_TYPE: NORMAL
MDC_IDC_SESS_TYPE: NORMAL
MDC_IDC_SET_BRADY_AT_MODE_SWITCH_RATE: 171 {BEATS}/MIN
MDC_IDC_SET_BRADY_AT_MODE_SWITCH_RATE: 171 {BEATS}/MIN
MDC_IDC_SET_BRADY_HYSTRATE: NORMAL
MDC_IDC_SET_BRADY_HYSTRATE: NORMAL
MDC_IDC_SET_BRADY_LOWRATE: 60 {BEATS}/MIN
MDC_IDC_SET_BRADY_LOWRATE: 60 {BEATS}/MIN
MDC_IDC_SET_BRADY_MAX_SENSOR_RATE: 130 {BEATS}/MIN
MDC_IDC_SET_BRADY_MAX_SENSOR_RATE: 130 {BEATS}/MIN
MDC_IDC_SET_BRADY_MAX_TRACKING_RATE: 130 {BEATS}/MIN
MDC_IDC_SET_BRADY_MAX_TRACKING_RATE: 130 {BEATS}/MIN
MDC_IDC_SET_BRADY_MODE: NORMAL
MDC_IDC_SET_BRADY_MODE: NORMAL
MDC_IDC_SET_BRADY_PAV_DELAY_LOW: 180 MS
MDC_IDC_SET_BRADY_PAV_DELAY_LOW: 180 MS
MDC_IDC_SET_BRADY_SAV_DELAY_LOW: 150 MS
MDC_IDC_SET_BRADY_SAV_DELAY_LOW: 150 MS
MDC_IDC_SET_LEADCHNL_RA_PACING_AMPLITUDE: 1.5 V
MDC_IDC_SET_LEADCHNL_RA_PACING_AMPLITUDE: 1.5 V
MDC_IDC_SET_LEADCHNL_RA_PACING_ANODE_ELECTRODE_1: NORMAL
MDC_IDC_SET_LEADCHNL_RA_PACING_ANODE_ELECTRODE_1: NORMAL
MDC_IDC_SET_LEADCHNL_RA_PACING_ANODE_LOCATION_1: NORMAL
MDC_IDC_SET_LEADCHNL_RA_PACING_ANODE_LOCATION_1: NORMAL
MDC_IDC_SET_LEADCHNL_RA_PACING_CAPTURE_MODE: NORMAL
MDC_IDC_SET_LEADCHNL_RA_PACING_CAPTURE_MODE: NORMAL
MDC_IDC_SET_LEADCHNL_RA_PACING_CATHODE_ELECTRODE_1: NORMAL
MDC_IDC_SET_LEADCHNL_RA_PACING_CATHODE_ELECTRODE_1: NORMAL
MDC_IDC_SET_LEADCHNL_RA_PACING_CATHODE_LOCATION_1: NORMAL
MDC_IDC_SET_LEADCHNL_RA_PACING_CATHODE_LOCATION_1: NORMAL
MDC_IDC_SET_LEADCHNL_RA_PACING_POLARITY: NORMAL
MDC_IDC_SET_LEADCHNL_RA_PACING_POLARITY: NORMAL
MDC_IDC_SET_LEADCHNL_RA_PACING_PULSEWIDTH: 0.4 MS
MDC_IDC_SET_LEADCHNL_RA_PACING_PULSEWIDTH: 0.4 MS
MDC_IDC_SET_LEADCHNL_RA_SENSING_ANODE_ELECTRODE_1: NORMAL
MDC_IDC_SET_LEADCHNL_RA_SENSING_ANODE_ELECTRODE_1: NORMAL
MDC_IDC_SET_LEADCHNL_RA_SENSING_ANODE_LOCATION_1: NORMAL
MDC_IDC_SET_LEADCHNL_RA_SENSING_ANODE_LOCATION_1: NORMAL
MDC_IDC_SET_LEADCHNL_RA_SENSING_CATHODE_ELECTRODE_1: NORMAL
MDC_IDC_SET_LEADCHNL_RA_SENSING_CATHODE_ELECTRODE_1: NORMAL
MDC_IDC_SET_LEADCHNL_RA_SENSING_CATHODE_LOCATION_1: NORMAL
MDC_IDC_SET_LEADCHNL_RA_SENSING_CATHODE_LOCATION_1: NORMAL
MDC_IDC_SET_LEADCHNL_RA_SENSING_POLARITY: NORMAL
MDC_IDC_SET_LEADCHNL_RA_SENSING_POLARITY: NORMAL
MDC_IDC_SET_LEADCHNL_RA_SENSING_SENSITIVITY: 0.3 MV
MDC_IDC_SET_LEADCHNL_RA_SENSING_SENSITIVITY: 0.3 MV
MDC_IDC_SET_LEADCHNL_RV_PACING_AMPLITUDE: 1.5 V
MDC_IDC_SET_LEADCHNL_RV_PACING_AMPLITUDE: 1.5 V
MDC_IDC_SET_LEADCHNL_RV_PACING_ANODE_ELECTRODE_1: NORMAL
MDC_IDC_SET_LEADCHNL_RV_PACING_ANODE_ELECTRODE_1: NORMAL
MDC_IDC_SET_LEADCHNL_RV_PACING_ANODE_LOCATION_1: NORMAL
MDC_IDC_SET_LEADCHNL_RV_PACING_ANODE_LOCATION_1: NORMAL
MDC_IDC_SET_LEADCHNL_RV_PACING_CAPTURE_MODE: NORMAL
MDC_IDC_SET_LEADCHNL_RV_PACING_CAPTURE_MODE: NORMAL
MDC_IDC_SET_LEADCHNL_RV_PACING_CATHODE_ELECTRODE_1: NORMAL
MDC_IDC_SET_LEADCHNL_RV_PACING_CATHODE_ELECTRODE_1: NORMAL
MDC_IDC_SET_LEADCHNL_RV_PACING_CATHODE_LOCATION_1: NORMAL
MDC_IDC_SET_LEADCHNL_RV_PACING_CATHODE_LOCATION_1: NORMAL
MDC_IDC_SET_LEADCHNL_RV_PACING_POLARITY: NORMAL
MDC_IDC_SET_LEADCHNL_RV_PACING_POLARITY: NORMAL
MDC_IDC_SET_LEADCHNL_RV_PACING_PULSEWIDTH: 0.4 MS
MDC_IDC_SET_LEADCHNL_RV_PACING_PULSEWIDTH: 0.4 MS
MDC_IDC_SET_LEADCHNL_RV_SENSING_ANODE_ELECTRODE_1: NORMAL
MDC_IDC_SET_LEADCHNL_RV_SENSING_ANODE_ELECTRODE_1: NORMAL
MDC_IDC_SET_LEADCHNL_RV_SENSING_ANODE_LOCATION_1: NORMAL
MDC_IDC_SET_LEADCHNL_RV_SENSING_ANODE_LOCATION_1: NORMAL
MDC_IDC_SET_LEADCHNL_RV_SENSING_CATHODE_ELECTRODE_1: NORMAL
MDC_IDC_SET_LEADCHNL_RV_SENSING_CATHODE_ELECTRODE_1: NORMAL
MDC_IDC_SET_LEADCHNL_RV_SENSING_CATHODE_LOCATION_1: NORMAL
MDC_IDC_SET_LEADCHNL_RV_SENSING_CATHODE_LOCATION_1: NORMAL
MDC_IDC_SET_LEADCHNL_RV_SENSING_POLARITY: NORMAL
MDC_IDC_SET_LEADCHNL_RV_SENSING_POLARITY: NORMAL
MDC_IDC_SET_LEADCHNL_RV_SENSING_SENSITIVITY: 0.9 MV
MDC_IDC_SET_LEADCHNL_RV_SENSING_SENSITIVITY: 0.9 MV
MDC_IDC_SET_ZONE_DETECTION_INTERVAL: 350 MS
MDC_IDC_SET_ZONE_DETECTION_INTERVAL: 350 MS
MDC_IDC_SET_ZONE_DETECTION_INTERVAL: 400 MS
MDC_IDC_SET_ZONE_DETECTION_INTERVAL: 400 MS
MDC_IDC_SET_ZONE_TYPE: NORMAL
MDC_IDC_STAT_AT_BURDEN_PERCENT: 0 %
MDC_IDC_STAT_AT_DTM_END: NORMAL
MDC_IDC_STAT_AT_DTM_START: NORMAL
MDC_IDC_STAT_BRADY_AP_VP_PERCENT: 0 %
MDC_IDC_STAT_BRADY_AP_VP_PERCENT: 0.01 %
MDC_IDC_STAT_BRADY_AP_VS_PERCENT: 0.81 %
MDC_IDC_STAT_BRADY_AP_VS_PERCENT: 1.98 %
MDC_IDC_STAT_BRADY_AS_VP_PERCENT: 0.02 %
MDC_IDC_STAT_BRADY_AS_VP_PERCENT: 0.02 %
MDC_IDC_STAT_BRADY_AS_VS_PERCENT: 97.99 %
MDC_IDC_STAT_BRADY_AS_VS_PERCENT: 99.16 %
MDC_IDC_STAT_BRADY_DTM_END: NORMAL
MDC_IDC_STAT_BRADY_DTM_END: NORMAL
MDC_IDC_STAT_BRADY_DTM_START: NORMAL
MDC_IDC_STAT_BRADY_DTM_START: NORMAL
MDC_IDC_STAT_BRADY_RA_PERCENT_PACED: 0.88 %
MDC_IDC_STAT_BRADY_RA_PERCENT_PACED: 2 %
MDC_IDC_STAT_BRADY_RV_PERCENT_PACED: 0.03 %
MDC_IDC_STAT_BRADY_RV_PERCENT_PACED: 0.03 %
MDC_IDC_STAT_EPISODE_RECENT_COUNT: 0
MDC_IDC_STAT_EPISODE_RECENT_COUNT: 4
MDC_IDC_STAT_EPISODE_RECENT_COUNT: 6
MDC_IDC_STAT_EPISODE_RECENT_COUNT_DTM_END: NORMAL
MDC_IDC_STAT_EPISODE_RECENT_COUNT_DTM_START: NORMAL
MDC_IDC_STAT_EPISODE_TOTAL_COUNT: 0
MDC_IDC_STAT_EPISODE_TOTAL_COUNT: 1
MDC_IDC_STAT_EPISODE_TOTAL_COUNT: 1
MDC_IDC_STAT_EPISODE_TOTAL_COUNT: 14
MDC_IDC_STAT_EPISODE_TOTAL_COUNT: 8
MDC_IDC_STAT_EPISODE_TOTAL_COUNT_DTM_END: NORMAL
MDC_IDC_STAT_EPISODE_TOTAL_COUNT_DTM_START: NORMAL
MDC_IDC_STAT_EPISODE_TYPE: NORMAL
MONOCYTES # BLD AUTO: 0.6 10E3/UL (ref 0–1.3)
MONOCYTES NFR BLD AUTO: 8 %
MUCOUS THREADS #/AREA URNS LPF: PRESENT /LPF
MUCOUS THREADS #/AREA URNS LPF: PRESENT /LPF
NEUTROPHILS # BLD AUTO: 4 10E3/UL (ref 1.6–8.3)
NEUTROPHILS NFR BLD AUTO: 57 %
NITRATE UR QL: NEGATIVE
NITRATE UR QL: NEGATIVE
NRBC # BLD AUTO: 0 10E3/UL
NRBC BLD AUTO-RTO: 0 /100
P AXIS - MUSE: 58 DEGREES
PH UR STRIP: 7 [PH] (ref 5–7)
PH UR STRIP: 7.5 [PH] (ref 5–7)
PLATELET # BLD AUTO: 330 10E3/UL (ref 150–450)
POTASSIUM BLD-SCNC: 4.1 MMOL/L (ref 3.5–5)
POTASSIUM BLD-SCNC: 4.4 MMOL/L (ref 3.5–5)
PR INTERVAL - MUSE: 146 MS
PROT SERPL-MCNC: 6.5 G/DL (ref 6–8)
PROT SERPL-MCNC: 7.1 G/DL (ref 6–8)
QRS DURATION - MUSE: 74 MS
QT - MUSE: 380 MS
QTC - MUSE: 441 MS
R AXIS - MUSE: 20 DEGREES
RBC # BLD AUTO: 4.43 10E6/UL (ref 3.8–5.2)
RBC URINE: 0 /HPF
RBC URINE: <1 /HPF
SARS-COV-2 RNA RESP QL NAA+PROBE: NEGATIVE
SODIUM SERPL-SCNC: 139 MMOL/L (ref 136–145)
SODIUM SERPL-SCNC: 139 MMOL/L (ref 136–145)
SP GR UR STRIP: 1.01 (ref 1–1.03)
SP GR UR STRIP: 1.02 (ref 1–1.03)
SQUAMOUS EPITHELIAL: 1 /HPF
SQUAMOUS EPITHELIAL: 12 /HPF
SYSTOLIC BLOOD PRESSURE - MUSE: NORMAL MMHG
T AXIS - MUSE: 51 DEGREES
TSH SERPL DL<=0.005 MIU/L-ACNC: 5.23 UIU/ML (ref 0.3–5)
UROBILINOGEN UR STRIP-MCNC: 2 MG/DL
UROBILINOGEN UR STRIP-MCNC: <2 MG/DL
VENTRICULAR RATE- MUSE: 81 BPM
WBC # BLD AUTO: 7 10E3/UL (ref 4–11)
WBC URINE: 2 /HPF
WBC URINE: <1 /HPF

## 2021-01-01 PROCEDURE — 84443 ASSAY THYROID STIM HORMONE: CPT | Mod: ORL | Performed by: PHYSICIAN ASSISTANT

## 2021-01-01 PROCEDURE — 93296 REM INTERROG EVL PM/IDS: CPT | Performed by: INTERNAL MEDICINE

## 2021-01-01 PROCEDURE — 82746 ASSAY OF FOLIC ACID SERUM: CPT | Mod: ORL | Performed by: FAMILY MEDICINE

## 2021-01-01 PROCEDURE — C9803 HOPD COVID-19 SPEC COLLECT: HCPCS

## 2021-01-01 PROCEDURE — P9604 ONE-WAY ALLOW PRORATED TRIP: HCPCS | Mod: ORL | Performed by: FAMILY MEDICINE

## 2021-01-01 PROCEDURE — 93005 ELECTROCARDIOGRAM TRACING: CPT | Performed by: PHYSICIAN ASSISTANT

## 2021-01-01 PROCEDURE — 71045 X-RAY EXAM CHEST 1 VIEW: CPT

## 2021-01-01 PROCEDURE — 250N000013 HC RX MED GY IP 250 OP 250 PS 637: Performed by: PHYSICIAN ASSISTANT

## 2021-01-01 PROCEDURE — 80053 COMPREHEN METABOLIC PANEL: CPT | Mod: ORL | Performed by: FAMILY MEDICINE

## 2021-01-01 PROCEDURE — U0005 INFEC AGEN DETEC AMPLI PROBE: HCPCS | Mod: ORL | Performed by: FAMILY MEDICINE

## 2021-01-01 PROCEDURE — 81001 URINALYSIS AUTO W/SCOPE: CPT | Performed by: EMERGENCY MEDICINE

## 2021-01-01 PROCEDURE — P9603 ONE-WAY ALLOW PRORATED MILES: HCPCS | Mod: ORL | Performed by: PHYSICIAN ASSISTANT

## 2021-01-01 PROCEDURE — 93294 REM INTERROG EVL PM/LDLS PM: CPT | Performed by: INTERNAL MEDICINE

## 2021-01-01 PROCEDURE — U0003 INFECTIOUS AGENT DETECTION BY NUCLEIC ACID (DNA OR RNA); SEVERE ACUTE RESPIRATORY SYNDROME CORONAVIRUS 2 (SARS-COV-2) (CORONAVIRUS DISEASE [COVID-19]), AMPLIFIED PROBE TECHNIQUE, MAKING USE OF HIGH THROUGHPUT TECHNOLOGIES AS DESCRIBED BY CMS-2020-01-R: HCPCS | Mod: ORL | Performed by: FAMILY MEDICINE

## 2021-01-01 PROCEDURE — 36415 COLL VENOUS BLD VENIPUNCTURE: CPT | Mod: ORL | Performed by: PHYSICIAN ASSISTANT

## 2021-01-01 PROCEDURE — 77063 BREAST TOMOSYNTHESIS BI: CPT

## 2021-01-01 PROCEDURE — 99214 OFFICE O/P EST MOD 30 MIN: CPT | Performed by: FAMILY MEDICINE

## 2021-01-01 PROCEDURE — 36415 COLL VENOUS BLD VENIPUNCTURE: CPT | Mod: ORL | Performed by: FAMILY MEDICINE

## 2021-01-01 PROCEDURE — 99285 EMERGENCY DEPT VISIT HI MDM: CPT | Mod: 25

## 2021-01-01 PROCEDURE — U0005 INFEC AGEN DETEC AMPLI PROBE: HCPCS | Performed by: PHYSICIAN ASSISTANT

## 2021-01-01 PROCEDURE — 82040 ASSAY OF SERUM ALBUMIN: CPT | Performed by: PHYSICIAN ASSISTANT

## 2021-01-01 PROCEDURE — 81001 URINALYSIS AUTO W/SCOPE: CPT | Mod: ORL | Performed by: FAMILY MEDICINE

## 2021-01-01 PROCEDURE — 87086 URINE CULTURE/COLONY COUNT: CPT | Mod: ORL | Performed by: FAMILY MEDICINE

## 2021-01-01 PROCEDURE — 85025 COMPLETE CBC W/AUTO DIFF WBC: CPT | Performed by: PHYSICIAN ASSISTANT

## 2021-01-01 PROCEDURE — 36415 COLL VENOUS BLD VENIPUNCTURE: CPT | Performed by: PHYSICIAN ASSISTANT

## 2021-01-01 RX ORDER — PROGESTERONE 100 MG/1
CAPSULE ORAL
Qty: 28 CAPSULE | Refills: 11 | OUTPATIENT
Start: 2021-01-01

## 2021-01-01 RX ORDER — QUETIAPINE FUMARATE 50 MG/1
50 TABLET, FILM COATED ORAL 2 TIMES DAILY
COMMUNITY

## 2021-01-01 RX ORDER — CITALOPRAM HYDROBROMIDE 20 MG/1
TABLET ORAL
COMMUNITY

## 2021-01-01 RX ORDER — CLONAZEPAM 0.5 MG/1
TABLET ORAL
COMMUNITY
Start: 2021-01-01

## 2021-01-01 RX ORDER — CLONAZEPAM 1 MG/1
1 TABLET ORAL ONCE
Status: COMPLETED | OUTPATIENT
Start: 2021-01-01 | End: 2021-01-01

## 2021-01-01 RX ORDER — LEVOTHYROXINE SODIUM 50 UG/1
50 TABLET ORAL DAILY
COMMUNITY
Start: 2020-06-21

## 2021-01-01 RX ORDER — LEVOTHYROXINE SODIUM 50 UG/1
TABLET ORAL
COMMUNITY
Start: 2021-01-01

## 2021-01-01 RX ORDER — GABAPENTIN 100 MG/1
100 CAPSULE ORAL 3 TIMES DAILY
COMMUNITY

## 2021-01-01 RX ORDER — LEVOTHYROXINE SODIUM 50 UG/1
TABLET ORAL
Qty: 28 TABLET | Refills: 11 | Status: SHIPPED | OUTPATIENT
Start: 2021-01-01

## 2021-01-01 RX ADMIN — CLONAZEPAM 1 MG: 1 TABLET ORAL at 20:33

## 2021-01-01 ASSESSMENT — ENCOUNTER SYMPTOMS
TREMORS: 1
FEVER: 0
DIARRHEA: 1
COUGH: 0
NERVOUS/ANXIOUS: 1
VOMITING: 0
PALPITATIONS: 1

## 2021-02-02 ENCOUNTER — AMBULATORY - HEALTHEAST (OUTPATIENT)
Dept: NURSING | Facility: CLINIC | Age: 76
End: 2021-02-02

## 2021-02-02 ENCOUNTER — AMBULATORY - HEALTHEAST (OUTPATIENT)
Dept: CARDIOLOGY | Facility: CLINIC | Age: 76
End: 2021-02-02

## 2021-02-02 DIAGNOSIS — I49.5 SICK SINUS SYNDROME (H): ICD-10-CM

## 2021-02-02 DIAGNOSIS — Z95.0 CARDIAC PACEMAKER IN SITU: ICD-10-CM

## 2021-02-18 ENCOUNTER — OFFICE VISIT - HEALTHEAST (OUTPATIENT)
Dept: FAMILY MEDICINE | Facility: CLINIC | Age: 76
End: 2021-02-18

## 2021-02-18 DIAGNOSIS — N95.9 MENOPAUSAL AND POSTMENOPAUSAL DISORDER: ICD-10-CM

## 2021-02-18 DIAGNOSIS — F41.9 ANXIETY: ICD-10-CM

## 2021-02-18 ASSESSMENT — ANXIETY QUESTIONNAIRES
6. BECOMING EASILY ANNOYED OR IRRITABLE: MORE THAN HALF THE DAYS
4. TROUBLE RELAXING: NEARLY EVERY DAY
2. NOT BEING ABLE TO STOP OR CONTROL WORRYING: NEARLY EVERY DAY
5. BEING SO RESTLESS THAT IT IS HARD TO SIT STILL: NEARLY EVERY DAY
GAD7 TOTAL SCORE: 20
1. FEELING NERVOUS, ANXIOUS, OR ON EDGE: NEARLY EVERY DAY
7. FEELING AFRAID AS IF SOMETHING AWFUL MIGHT HAPPEN: NEARLY EVERY DAY
3. WORRYING TOO MUCH ABOUT DIFFERENT THINGS: NEARLY EVERY DAY
IF YOU CHECKED OFF ANY PROBLEMS ON THIS QUESTIONNAIRE, HOW DIFFICULT HAVE THESE PROBLEMS MADE IT FOR YOU TO DO YOUR WORK, TAKE CARE OF THINGS AT HOME, OR GET ALONG WITH OTHER PEOPLE: SOMEWHAT DIFFICULT

## 2021-02-23 ENCOUNTER — AMBULATORY - HEALTHEAST (OUTPATIENT)
Dept: NURSING | Facility: CLINIC | Age: 76
End: 2021-02-23

## 2021-02-25 ENCOUNTER — COMMUNICATION - HEALTHEAST (OUTPATIENT)
Dept: FAMILY MEDICINE | Facility: CLINIC | Age: 76
End: 2021-02-25

## 2021-02-25 ENCOUNTER — AMBULATORY - HEALTHEAST (OUTPATIENT)
Dept: FAMILY MEDICINE | Facility: CLINIC | Age: 76
End: 2021-02-25

## 2021-02-25 DIAGNOSIS — G31.84 MILD NEUROCOGNITIVE DISORDER: ICD-10-CM

## 2021-02-26 ENCOUNTER — COMMUNICATION - HEALTHEAST (OUTPATIENT)
Dept: NURSING | Facility: CLINIC | Age: 76
End: 2021-02-26

## 2021-02-28 ENCOUNTER — COMMUNICATION - HEALTHEAST (OUTPATIENT)
Dept: FAMILY MEDICINE | Facility: CLINIC | Age: 76
End: 2021-02-28

## 2021-03-21 ENCOUNTER — COMMUNICATION - HEALTHEAST (OUTPATIENT)
Dept: FAMILY MEDICINE | Facility: CLINIC | Age: 76
End: 2021-03-21

## 2021-03-27 ENCOUNTER — RECORDS - HEALTHEAST (OUTPATIENT)
Dept: ADMINISTRATIVE | Facility: OTHER | Age: 76
End: 2021-03-27

## 2021-04-08 ENCOUNTER — AMBULATORY - HEALTHEAST (OUTPATIENT)
Dept: OTHER | Facility: CLINIC | Age: 76
End: 2021-04-08

## 2021-04-08 ENCOUNTER — RECORDS - HEALTHEAST (OUTPATIENT)
Dept: ADMINISTRATIVE | Facility: OTHER | Age: 76
End: 2021-04-08

## 2021-04-08 ENCOUNTER — DOCUMENTATION ONLY (OUTPATIENT)
Dept: OTHER | Facility: CLINIC | Age: 76
End: 2021-04-08

## 2021-04-19 ENCOUNTER — COMMUNICATION - HEALTHEAST (OUTPATIENT)
Dept: FAMILY MEDICINE | Facility: CLINIC | Age: 76
End: 2021-04-19

## 2021-04-21 ENCOUNTER — RECORDS - HEALTHEAST (OUTPATIENT)
Dept: ADMINISTRATIVE | Facility: OTHER | Age: 76
End: 2021-04-21

## 2021-04-24 ENCOUNTER — COMMUNICATION - HEALTHEAST (OUTPATIENT)
Dept: FAMILY MEDICINE | Facility: CLINIC | Age: 76
End: 2021-04-24

## 2021-04-27 ENCOUNTER — COMMUNICATION - HEALTHEAST (OUTPATIENT)
Dept: FAMILY MEDICINE | Facility: CLINIC | Age: 76
End: 2021-04-27

## 2021-04-30 ENCOUNTER — OFFICE VISIT - HEALTHEAST (OUTPATIENT)
Dept: FAMILY MEDICINE | Facility: CLINIC | Age: 76
End: 2021-04-30

## 2021-04-30 DIAGNOSIS — Z23 NEED FOR VACCINATION: ICD-10-CM

## 2021-04-30 DIAGNOSIS — Z20.822 COVID-19 RULED OUT: ICD-10-CM

## 2021-04-30 DIAGNOSIS — G31.84 MILD NEUROCOGNITIVE DISORDER: ICD-10-CM

## 2021-04-30 DIAGNOSIS — Z00.00 PHYSICAL EXAM: ICD-10-CM

## 2021-04-30 ASSESSMENT — MIFFLIN-ST. JEOR: SCORE: 1006.16

## 2021-05-01 ENCOUNTER — COMMUNICATION - HEALTHEAST (OUTPATIENT)
Dept: SCHEDULING | Facility: CLINIC | Age: 76
End: 2021-05-01

## 2021-05-01 LAB
SARS-COV-2 PCR COMMENT: NORMAL
SARS-COV-2 RNA SPEC QL NAA+PROBE: NEGATIVE
SARS-COV-2 VIRUS SPECIMEN SOURCE: NORMAL

## 2021-05-02 ENCOUNTER — COMMUNICATION - HEALTHEAST (OUTPATIENT)
Dept: SCHEDULING | Facility: CLINIC | Age: 76
End: 2021-05-02

## 2021-05-03 ENCOUNTER — COMMUNICATION - HEALTHEAST (OUTPATIENT)
Dept: FAMILY MEDICINE | Facility: CLINIC | Age: 76
End: 2021-05-03

## 2021-05-04 ENCOUNTER — AMBULATORY - HEALTHEAST (OUTPATIENT)
Dept: CARDIOLOGY | Facility: CLINIC | Age: 76
End: 2021-05-04

## 2021-05-04 DIAGNOSIS — Z95.0 CARDIAC PACEMAKER IN SITU: ICD-10-CM

## 2021-05-04 DIAGNOSIS — I49.5 SICK SINUS SYNDROME (H): ICD-10-CM

## 2021-05-06 ENCOUNTER — COMMUNICATION - HEALTHEAST (OUTPATIENT)
Dept: FAMILY MEDICINE | Facility: CLINIC | Age: 76
End: 2021-05-06

## 2021-05-12 ENCOUNTER — RECORDS - HEALTHEAST (OUTPATIENT)
Dept: ADMINISTRATIVE | Facility: OTHER | Age: 76
End: 2021-05-12

## 2021-05-25 ENCOUNTER — RECORDS - HEALTHEAST (OUTPATIENT)
Dept: ADMINISTRATIVE | Facility: CLINIC | Age: 76
End: 2021-05-25

## 2021-05-26 ENCOUNTER — RECORDS - HEALTHEAST (OUTPATIENT)
Dept: FAMILY MEDICINE | Facility: CLINIC | Age: 76
End: 2021-05-26

## 2021-05-27 ENCOUNTER — RECORDS - HEALTHEAST (OUTPATIENT)
Dept: ADMINISTRATIVE | Facility: CLINIC | Age: 76
End: 2021-05-27

## 2021-05-27 ASSESSMENT — PATIENT HEALTH QUESTIONNAIRE - PHQ9: SUM OF ALL RESPONSES TO PHQ QUESTIONS 1-9: 7

## 2021-05-28 ENCOUNTER — RECORDS - HEALTHEAST (OUTPATIENT)
Dept: ADMINISTRATIVE | Facility: CLINIC | Age: 76
End: 2021-05-28

## 2021-05-28 ASSESSMENT — ANXIETY QUESTIONNAIRES: GAD7 TOTAL SCORE: 20

## 2021-05-28 NOTE — PROGRESS NOTES
Assessment / Impression   1.  Dementia the Alzheimer type at an MCI level of impairment  2.  History of intolerance to Aricept      Plan:   1.  Begin Razadyne ER 8 mg p.o. daily  2.  OT evaluation on return to clinic in 1 month    A long conversation held with the patient and  Adolfo in attendance.  The patient was unable to tolerate Aricept due to intractable diarrhea and leg cramps.  She has noted some progression in short-term memory impairment and Huang noted some difficulty in reorienting to the [facility] environment when traveling from [place] where both she and her  wintered.  At this point time the patient appears to desire more proactive approach and we will begin with a trial of an alternative cholinesterase inhibitor.  OT assessment with follow-up on return to clinic.  I do believe high-dose vitamin E will likely be in order on return.    Total time for evaluation 30 minutes with majority time spent in counseling.      Subjective:     HPI: Roselyn Garcia is a 73 y.o. female with above-noted diagnoses who is seen in follow-up.  The patient had difficulty tolerating Aricept and discontinued this therapy after 1 month while in [place].  Noting changes in short-term recall, the patient was found to be quite emotional today and recalling these difficulties.  She also appears to be a bit frustrated with the pace of adjustments and treatment and thus the rationale for adjusting her follow-up schedule as noted above.          Review of Systems:          As above.  The patient is taking a low-dose of citalopram that has been quite helpful for anxiety and depression        Objective:   There were no vitals filed for this visit.    No results found for this or any previous visit (from the past 24 hour(s)).    Physical Exam: Patient is neatly groomed casually dressed and seated for evaluation.  And noted to be tearful today.  She is socially appropriate with no evidence of overt confusion.  No  evidence of physical distress.

## 2021-05-28 NOTE — PROGRESS NOTES
Persons accompanying you (the patient) today:  Huang     How have you been doing since we saw you last? Please note any concerns.  Pt had too many side effects from donepezil and d/c medication which was said from Dr. Tarango. Here today to figure out a plan/actions needed for next steps     Please list any recent hospitalizations/surgeries/procedures you've had since we saw you last:  None     Have you had any falls since your last visit? No    Do you have any pain today? No

## 2021-05-28 NOTE — PROGRESS NOTES
Assessment / Impression   1.  Alzheimer's disease at an MCI versus early Omar  2.  History of intolerance to Aricept      Plan:   1.  Continue Razadyne ER 8 mg p.o. daily  2.  Follow-up in 1 month at which time we will consider increase to 16 mg/day Razadyne  3.  After 1 more week, begin vitamin E 1000 IU p.o. twice daily    Long conversation held with the patient and  Huang in attendance.  OT evaluation reveals what appears to be early dementia levels of impairment with ACL 4.8 CPT 4.5 and [place] 17/30.  Of interest, the  does not believe his wife could function well independently at this point in time consistent with functional test scores.  At this point time I outlined treatment programming with focusing on wellness and environmental support.  We will add vitamin E and adjust cholinesterase inhibitor therapies as noted above.    Total time for evaluation 30 minutes with majority time spent in counseling.      Subjective:     HPI: Roselyn Garcia is a 73 y.o. female with above-noted diagnoses who returns for follow-up.  The patient appears to be feeling well at this time.  She offers no new complaints          Review of Systems:          As noted        Objective:     Vitals:    05/09/19 1257   BP: 114/55   Pulse: 68       No results found for this or any previous visit (from the past 24 hour(s)).    Physical Exam: As noted previously

## 2021-05-28 NOTE — PROGRESS NOTES
Occupational Therapy Evaluation      [employer] Medicare Insurance    Units: 1 OT evaluation  Total Minutes: 55    Medical Diagnosis: Mild Neurocognitive Disorder  Treatment Diagnosis: Cognitive Impairment  Referring Practitioner: Darvin Tarango MD  Date of referral: 4/29/19  Start of care: 5/9/2019    Patient was referred by Dr. Tarango for an occupational therapy outpatient cognitive evaluation. The assessments used in this evaluation will help determine if cognitive impairment is present and if so, how functional daily activity may be affected.  Following the assessments, the occupational therapist may offer education and recommendations to assist caregivers in providing the optimal level of support for their loved one. Patient was seen on 5/9/2019 and was accompanied by her , Adolfo.  Patient appeared appropriately groomed and alert.  Patient  ambulated independently.    When asked if they knew the purpose of this appointment, pt was unaware. .    Living Arrangement:  Lives in a town home with her   Homemaking:  Patient does majority but  helps.    Financial Management: Pt's  manages  Medications: Patient uses pill box which she sets up.  Patient reports no difficulty remembering to take meds.    Driving:  Yes, but patient's  mostly drives.   ADL:  Independent  Leisure: Walking, reading, cooking, crafts    Pain: Yes, 2/10 R leg pain    OBJECTIVE  Results of assessments are as follows:    ACL: 4.8/5.8    CPT:   Sub-test Score   Med box 4.5/6   Shop 5/6   Wash 5/5   [place] 4/5   Phone 4/6       Average Score 4.5/5.6       MOCA: 17/30 - Version 8.2  MOCA memory index score: 3/15    The above assessment scores indicate a mild-moderate level of impairment.      ASSESSMENT  Recommendations:  Cognitive functioning recommendations: 4.6-4.8: The assessment scores indicate a recommendation for an assisted living environment with daily check-in supervision (i.e. at home with assistance or  assisted living facility). Assistance with managing medications and finances is recommended as Roselyn may have increased difficulty with complex problem solving. Learning new skills and information may be very challenging for Roselyn but she may be able to do so with a lot of repetition. Problem solving is often challenging especially when it is not anticipated or expected. Roselyn may lack insight into her deficits indicating an increased need for assistance with complex tasks requiring accuracy for safety. Supervision is recommended for Roselyn when using hot tools and major appliances during meal preparation. Driving is not recommended for Roselyn due to difficulty with divided attention and complex problem solving. Further, Roselyn may benefit from increased structure throughout the day creating healthy habits and routines and eliminating her need to problem solve from one task to another.      Thank you for this referral.  If I can be of further assistance, please do not hesitate to contact me.    Insurance: [place], [place] #[id number]; Provider # ; Certification Dates: from 5/9/2019 to 5/9/2019       Physician Recommendation:  1. I certify the need for these services furnished within this plan and while under my care. I agree with the therapist's recommendation for plan of care.  2. If there is any recommendation for modification of therapy plan, please indicate below.    RODRÍGUEZ Tobar, OTR/L on 5/9/2019

## 2021-05-28 NOTE — PROGRESS NOTES
Persons accompanying you (the patient) today: Adolfo     How have you been doing since we saw you last? Please note any concerns.  Pt has been doing okay.  Pt states her memory is getting worse.  She started galantamine on last apt with Dr. Tarango and states she's had no side effects from the medication and has been doing very well.

## 2021-05-28 NOTE — PROGRESS NOTES
Chief complaint: Medication check    HPI: The patient and her  are here to discuss her citalopram which is working very well.  She feels great and has no residual anxiety and almost wonders why she did not try this earlier.  She has hypothyroidism and when we checked it last in August the TSH had increased from just over 1 to just over 3.  She has had some bowel troubles but it was attributed to the Aricept that her neuro doctor was trying to prescribe for her cognitive decline.  They have stopped that and see if that person soon.  She still is having some trouble with constipation so we discussed MiraLAX.  The Benefiber is not helping very much.  I think it would be reasonable to do another TSH today to make sure I do not need to do some adjusting of her dosage as I suspect I will.    She is also complaining of bilateral breast pain laterally.  She typically gets a 3D mammogram and cannot get it until July as screening.  I told her I could order a diagnostic mammogram with ultrasound but she would have to pay for that so they opted not to do that at this time she will wait until July.    When she was in [place] she fell on her left side and still has a little bit of trouble with that but did not have any break in the skin that she is aware of but then this morning she woke up and had some purple blotches on her lateral right leg without knowing that she had any trauma.    Objective:/64 (Patient Site: Left Arm, Patient Position: Sitting, Cuff Size: Adult Regular)   Pulse 60   Wt 121 lb 3.2 oz (55 kg)   SpO2 99%   BMI 22.17 kg/m    Her affect is appropriate her PHQ 9 is 0 and her FLORENCE-7 is 2  She has irregularly-shaped reddish-purple blotches on her lateral right shin.  It is not tender to touch it is not really rough I have no idea what this might represent so I suggested topical antibiotic couple times a day for about a week and if things are getting better they should see a dermatologist next  week.    They had questions about the daily use of baby aspirin for cardiovascular primary prevention.  I reviewed some of the literature provided by our [facility] pharmacist and it does not appear that there is great data for her so they are going to back off to 3 days a week instead of 7 days a week for an enteric-coated baby aspirin.  I think she is almost at the point where the risk of bleed is greater than the risk of protection.    Assessment: Anxiety medication management doing well  Hypothyroidism likely not adequately replaced  Skin lesion right lower shin unclear etiology  Cognitive decline managed by  at [facility]    Plan: We will continue the citalopram and she has enough refills until I see her in August  Do a stat TSH today to see if I need to adjust her dose and then we will check labs again in 6 weeks  If the skin lesion is getting better with antibiotic ointment they should see a dermatologist  She will follow-up with Dr. Tarango at [facility] and follow-up with me in August  She will do a 3D mammogram in July and let me know if she changes her mind about getting a diagnostic mammogram and ultrasound prior to that.

## 2021-05-29 ENCOUNTER — RECORDS - HEALTHEAST (OUTPATIENT)
Dept: ADMINISTRATIVE | Facility: CLINIC | Age: 76
End: 2021-05-29

## 2021-05-29 NOTE — PROGRESS NOTES
Assessment / Impression   1.  Dementia the also retyped at an MCI level of impairment  2.  History of intolerance to Aricept      Plan:   1.  Increase Razadyne to 16 mg p.o. daily  2.  Wellness programming  3.  Follow-up in 3 months prior to the patient leaving for the winter to [place]    A long conversation with the patient and  Huang in attendance.  The patient is doing well on 8 mg of Razadyne is and is willing to trial 16 mg.  She is also taking higher dose vitamin E 1000 IU p.o. twice daily.  At this point time we discussed wellness programming as well as other potential interventions for cognitive and functional enhancement.  Total time for evaluation 25 minutes with majority time spent in counseling.      Subjective:     HPI: Roselyn Garcia is a 73 y.o. female with above-noted diagnoses who returns for follow-up.  The patient reports feeling well with no side effects noted with Razadyne.  She did initiate therapy with high-dose vitamin E without incident as well.          Review of Systems:          As above        Objective:   There were no vitals filed for this visit.    No results found for this or any previous visit (from the past 24 hour(s)).    Physical Exam: Neatly groomed and casually dressed, the patient seated for evaluation.  She is alert pleasant and directable at this time.  Affect is pleasant and mood congruent.  No evidence of inattention.

## 2021-05-29 NOTE — PROGRESS NOTES
Pt being seen for medication f/u apt.  Pt has been tolerating medication well and here to discuss an increase.  She is accompanied by  Karolina

## 2021-05-30 ENCOUNTER — RECORDS - HEALTHEAST (OUTPATIENT)
Dept: ADMINISTRATIVE | Facility: CLINIC | Age: 76
End: 2021-05-30

## 2021-05-30 ENCOUNTER — HEALTH MAINTENANCE LETTER (OUTPATIENT)
Age: 76
End: 2021-05-30

## 2021-05-30 VITALS — WEIGHT: 114.9 LBS | BODY MASS INDEX: 21.14 KG/M2 | HEIGHT: 62 IN

## 2021-05-31 VITALS — BODY MASS INDEX: 21.04 KG/M2 | WEIGHT: 114.3 LBS | HEIGHT: 62 IN

## 2021-05-31 NOTE — TELEPHONE ENCOUNTER
Left message for  stating that Dr. Tarango is out of the office and I was unable to see the [employer] messages.  He left us a message stating that pt has stopped the galantamine completely and are wondering which other medications to try.  I talked with NP and she said to talk about the exelon patch at the next apt on 09/16/2019

## 2021-05-31 NOTE — TELEPHONE ENCOUNTER
Refill Approved    Rx renewed per Medication Renewal Policy. Medication was last renewed on 9/24/18 .    Nida Sanders, [facility] Triage/Med Refill 8/10/2019     Requested Prescriptions   Pending Prescriptions Disp Refills     citalopram (CELEXA) 10 MG tablet [Pharmacy Med Name: CITALOPRAM 10MG TABLETS] 90 tablet 3     Sig: TAKE 1 TABLET BY MOUTH DAILY AS DIRECTED       SS[facility] Refill Protocol  Passed - 8/9/2019  1:29 PM        Passed - PCP or prescribing provider visit in last year     Last office visit with prescriber/PCP: 4/26/2019 Niurka Austin MD OR same dept: 4/26/2019 Niurka Austin MD OR same specialty: 4/26/2019 Niurka Austin MD  Last physical: 8/16/2018 Last MTM visit: Visit date not found   Next visit within 3 mo: Visit date not found  Next physical within 3 mo: Visit date not found  Prescriber OR PCP: Niurka Austin MD  Last diagnosis associated with med order: There are no diagnoses linked to this encounter.  If protocol passes may refill for 12 months if within 3 months of last provider visit (or a total of 15 months).

## 2021-05-31 NOTE — PROGRESS NOTES
"Preoperative Exam    Scheduled Procedure: Cataract; right and then left 1 week later  Surgery Date:  8/27/19  Surgery Location: [facility]    Surgeon:  Dr. Parsons    Has had cataracts developing and are now \"ripe\" enough to remove    Assessment/Plan:     1. Preop general physical exam       2. Age-related cataract of both eyes, unspecified age-related cataract type       3. Essential Hypercholesterolemia       4. Sensorineural hearing loss (SNHL) of left ear with restricted hearing of right ear       5. Elevated TSH       6. Mild neurocognitive disorder           Surgical Procedure Risk: Low (reported cardiac risk generally < 1%)  Have you had prior anesthesia?: Yes  Have you or any family members had a previous anesthesia reaction:  No  Do you or any family members have a history of a clotting or bleeding disorder?: No  Cardiac Risk Assessment: no increased risk for major cardiac complications    APPROVAL GIVEN to proceed with proposed procedure, without further diagnostic evaluation    Please Note:  Patient has an implanted device.  Device Type: interstim      Device Vendor:  [employer]    Functional Status: Independent  Patient plans to recover at home with family.     Subjective:      Roselyn Garcia is a 73 y.o. female who presents for a preoperative consultation.  Is now ready for cataract extraction and IOL implant bilaterally    All other systems reviewed and are negative, other than those listed in the HPI.    Pertinent History  Do you have difficulty breathing or chest pain after walking up a flight of stairs: No  History of obstructive sleep apnea: Yes, no problems currently  Steroid use in the last 6 months: No  Frequent Aspirin/NSAID use: Yes: 81mg ASA three times a week  Prior Blood Transfusion: No  Prior Blood Transfusion Reaction: No  If for some reason prior to, during or after the procedure, if it is medically indicated, would you be willing to have a blood transfusion?:  There is no transfusion " refusal.    Current Outpatient Medications   Medication Sig Dispense Refill     aspirin 81 MG EC tablet Take 81 mg by mouth daily.              calcium-vitamin D (CALCIUM-VITAMIN D) 500 mg(1,250mg) -200 unit per tablet Take 1 tablet by mouth 2 (two) times a day with meals.       citalopram (CELEXA) 10 MG tablet TAKE 1 TABLET BY MOUTH DAILY AS DIRECTED 90 tablet 2     estradiol (ESTRACE) 1 MG tablet Take 1 tablet (1 mg total) by mouth 2 (two) times a day. 180 tablet 3     galantamine (RAZADYNE ER) 16 MG 24 hr capsule Take 1 capsule (16 mg total) by mouth daily with breakfast. 90 capsule 3     lactobacillus rhamnosus GG (CULTURELLE) 10-15 Billion cell capsule Take 1 capsule by mouth daily.       levothyroxine (SYNTHROID, LEVOTHROID) 50 MCG tablet TAKE ONE TABLET BY MOUTH ONCE DAILY 90 tablet 3     pravastatin (PRAVACHOL) 20 MG tablet Take 1 tablet (20 mg total) by mouth every evening. 90 tablet 3     progesterone (PROMETRIUM) 100 MG capsule Take 1 capsule (100 mg total) by mouth at bedtime. 90 capsule 3     pyridoxine, vitamin B6, (VITAMIN B-6) 100 MG tablet Take 200 mg by mouth daily.       SUMAtriptan (IMITREX) 25 MG tablet Take 1 tablet (25 mg total) by mouth every 2 (two) hours as needed for migraine. 10 tablet 5     vitamin E 1000 UNIT capsule Take 2,000 Units by mouth daily.       No current facility-administered medications for this visit.         No Known Allergies    Patient Active Problem List   Diagnosis     Essential Hypercholesterolemia     Osteopenia     Migraine Headache     Menopausal Disorder     Imaging Studies Nonspecific Abnormal Findings Breast     Elevated TSH     Mild neurocognitive disorder     Sensorineural hearing loss (SNHL) of left ear with restricted hearing of right ear       No past medical history on file.    Past Surgical History:   Procedure Laterality Date     BREAST EXCISIONAL BIOPSY Right     While ago     HYSTERECTOMY  1972     MA APPENDECTOMY      Description: Appendectomy;   Recorded: 10/08/2008;     RI BREAST SURGERY PROCEDURE UNLISTED      Description: Breast Surgery;  Recorded: 10/08/2008;     RI REMOVAL OF TONSILS,<10 Y/O      Description: Tonsillectomy;  Recorded: 10/08/2008;     RI REV UPPER EYELID W EXCESS SKIN      Description: Blepharoplasty Upper Lid W/ Excessive Skin;  Recorded: 08/02/2013;     RI VAGINAL HYSTERECTOMY,UTERUS 250 GMS/<      Description: Vaginal Hysterectomy;  Recorded: 08/19/2013;  Comments: prolapse.....has ovaries     RI VAGINAL HYSTERECTOMY,UTERUS 250 GMS/<      Description: Vaginal Hysterectomy;  Recorded: 08/15/2014;  Comments: prolapse.....has ovaries     SACRAL NERVE STIMULATOR PLACEMENT  07/31/2013    Dr Campo       Social History     Socioeconomic History     Marital status:      Spouse name: Adolfo     Number of children: 2     Years of education: Not on file     Highest education level: Not on file   Occupational History     Occupation: retired   Social Needs     Financial resource strain: Not on file     Food insecurity:     Worry: Not on file     Inability: Not on file     Transportation needs:     Medical: Not on file     Non-medical: Not on file   Tobacco Use     Smoking status: Never Smoker     Smokeless tobacco: Never Used   Substance and Sexual Activity     Alcohol use: No     Drug use: No     Sexual activity: Yes     Partners: Male     Birth control/protection: Post-menopausal   Lifestyle     Physical activity:     Days per week: Not on file     Minutes per session: Not on file     Stress: Not on file   Relationships     Social connections:     Talks on phone: Not on file     Gets together: Not on file     Attends Anglican service: Not on file     Active member of club or organization: Not on file     Attends meetings of clubs or organizations: Not on file     Relationship status: Not on file     Intimate partner violence:     Fear of current or ex partner: Not on file     Emotionally abused: Not on file     Physically abused: Not on  "file     Forced sexual activity: Not on file   Other Topics Concern     Not on file   Social History Narrative    Confucianism       Patient Care Team:  Niurka Austin MD as PCP - General          Objective:     Vitals:    08/21/19 0811   BP: 111/57   Pulse: 65   SpO2: 97%   Weight: 119 lb 14.4 oz (54.4 kg)   Height: 5' 1.75\" (1.568 m)   LMP: 07/29/1972         Physical Exam:  General Appearance: Alert, cooperative, no distress, appears stated age  Head: Normocephalic, without obvious abnormality, atraumatic  Eyes: PERRL, conjunctiva/corneas clear, EOM's intact. Glasses   Ears: Normal TM's and external ear canals, both ears-not wearing hearing aids today  Nose: Nares normal, septum midline,mucosa normal, no drainage  Throat: Lips, mucosa, and tongue normal; teeth and gums normal  Neck: Supple, symmetrical, trachea midline, no adenopathy;  thyroid: not enlarged, symmetric, no tenderness/mass/nodules;    Lungs: Clear to auscultation bilaterally, respirations unlabored  Heart: Regular rate and rhythm, S1 and S2 normal, no murmur, rub, or gallop, Abdomen: Soft, non-tender, bowel sounds active all four quadrants,  no masses, no organomegaly  Extremities: Extremities normal, atraumatic, no cyanosis or edema  Skin: Skin color, texture, turgor normal, no rashes or lesions  Lymph nodes: Cervical, supraclavicular, and axillary nodes normal  Neurologic: Normal     There are no Patient Instructions on file for this visit.      Labs:  none indicated for this surgery    Immunization History   Administered Date(s) Administered     DT (pediatric) 07/11/2000     Hep A, historic 09/03/2007, 10/08/2008     Influenza high dose, seasonal 10/06/2015, 08/22/2016, 09/22/2017, 09/14/2018     Influenza, Seasonal, Inj PF IIV3 10/11/2012     Influenza, inj, historic,unspecified 10/08/2007, 10/08/2008     Influenza,seasonal quad, PF 10/04/2013, 10/09/2014     PPD Test 10/01/2001     Pneumo Conj 13-V (2010&after) 08/19/2015     Pneumo Polysac " 23-V 08/05/2009     Rubella 11/01/2000     Td,adult,historic,unspecified 08/05/2009     Tdap 08/05/2009     ZOSTER, LIVE 08/24/2009     Zoster Vaccine Unspecified (Historical) 03/04/2019, 05/07/2019           Electronically signed by Niurka Austin MD 08/21/19 7:52 AM

## 2021-06-01 ENCOUNTER — COMMUNICATION - HEALTHEAST (OUTPATIENT)
Dept: CARDIOLOGY | Facility: CLINIC | Age: 76
End: 2021-06-01

## 2021-06-01 VITALS — BODY MASS INDEX: 21.47 KG/M2 | HEIGHT: 62 IN | WEIGHT: 116.7 LBS

## 2021-06-01 NOTE — PROGRESS NOTES
Persons accompanying you (the patient) today:     How have you been doing since we saw you last? Please note any concerns.  Patient has no concerns at this time    Please list any recent hospitalizations/surgeries/procedures you've had since we saw you last:  Patient had cataract surgery on both eyes in august, one week apart.     Have you had any falls since your last visit? No    Do you have any pain today? No

## 2021-06-01 NOTE — PROGRESS NOTES
Chief complaint: Med check    HPI: She continues to do well in the citalopram.    She has some early dementia and the Aricept was not well tolerated; she got pretty severe diarrhea.  She then went on galantamine and did okay with 8 mg a day and when they tried to increase her to 60 mg a day she started to have diarrhea.  There is a family history of intolerance to this and her mother and her sister as well.  She is been off the medication for a while and there cognitive specialist MD has asked her to restart 8 mg when they get to [place] to make sure she does not have diarrhea from that.    She is fasting today so we can do her fasting labs for her cholesterol and once those labs are back, but I can refill her medications for another year and get all of her meds on the same yearly cycle.    Objective:/69 (Patient Site: Right Arm, Patient Position: Sitting, Cuff Size: Adult Regular)   Pulse 71   Wt 121 lb 8 oz (55.1 kg)   LMP 07/29/1972   SpO2 97%   BMI 22.40 kg/m    She was rushing to get here and then they had to wait in the nervous and agitated when she had her blood pressure checked.  Her blood pressures have been fine otherwise we opted not to recheck that.  Her affect is appropriate.    Assessment: Mild cognitive delay  Elevated blood pressure  Hyperlipidemia  Migraines  Menopausal symptoms  Hypothyroidism    Plan: We will do her fasting labs and refill her cholesterol medications once I get the labs returned and will see them in about 6 months when they return from [place].

## 2021-06-01 NOTE — TELEPHONE ENCOUNTER
"Patient has a future lab appointment on 09/30/2019 for \"Labs at 8:30 and seeing you at 8:50\". There are no lab orders. Could you please review the patient's chart and place orders?    If no lab orders are needed at this time, please forward this message to [facility] Registration Pool. They will then call the patient and inform them that no labs are needed at this time per provider.     Thanks  "

## 2021-06-01 NOTE — PROGRESS NOTES
Assessment / Impression   1.  Dementia the also retyped at an MCI level of impairment  2.  History of intolerance to both donepezil and higher dose (16 mg) galantamine      Plan:   1.  Continue high-dose vitamin E  2.  Trial 4.6 mg rivastigmine patch  3.  Patient is to discontinue patch with any GI intolerance  4.  Continue 4.6 mg rivastigmine patch if tolerated (patient will call for prescription as I gave her only a 14-day supply given her bad luck with the alternative therapies) for at least 3 months before consideration of up titration  5.  Follow-up in 3 months    Long conversation with the patient and  in attendance.  The patient apparently did well with 8 mg of Razadyne had intolerance to 60 mg.  She is also been intolerant to donepezil.  Unfortunately, the patient apparently took the 60 mg Razadyne for a number of weeks before finally discontinuing it.  I expressed my opinion that she is not to experience side effects and if side effects are experienced the medicine should be discontinued and I should be contacted.  I discussed multiple options at this time including no further retrial of cholinesterase inhibitor therapy, trial of transdermal rivastigmine with the option of upward titration if tolerated, repeat trial of 8 mg of Razadyne with no consider ration for up titration, trial of memantine (question utility in the context of high-dose vitamin E given latest vitami  study) versus continuation of vitamin D therapy alone    Total time for evaluation 30 minutes with majority time spent counseling.      Subjective:     HPI: Roselyn Garcia is a 74 y.o. female with above-noted diagnoses who returns for follow-up.  The patient has had intolerance to low-dose Aricept and now 16 mg Razadyne.  She had tolerated 8 mg Razadyne at the time of her follow-up in June we elected increased to 16.  As noted above, the patient tolerated the significant GI effects for a number of weeks before finally discontinuing  the medication.  At this point in time she expresses a desire to consider further interventions as outlined above.          Review of Systems:          As above        Objective:     Vitals:    09/12/19 1315 09/12/19 1316 09/12/19 1317   BP: 148/65 142/65 130/59   Pulse: 68 65 64       No results found for this or any previous visit (from the past 24 hour(s)).    Physical Exam: The patient is neatly groomed casually dressed and seated for evaluation.  She does appear slightly apprehensive but otherwise and noted to be in good spirits.  No evidence of significant medical or behavioral difficulties.

## 2021-06-02 ENCOUNTER — COMMUNICATION - HEALTHEAST (OUTPATIENT)
Dept: CARDIOLOGY | Facility: CLINIC | Age: 76
End: 2021-06-02

## 2021-06-02 ENCOUNTER — RECORDS - HEALTHEAST (OUTPATIENT)
Dept: ADMINISTRATIVE | Facility: CLINIC | Age: 76
End: 2021-06-02

## 2021-06-02 VITALS — WEIGHT: 121 LBS | BODY MASS INDEX: 22.13 KG/M2

## 2021-06-02 VITALS — BODY MASS INDEX: 22.24 KG/M2 | WEIGHT: 121.6 LBS

## 2021-06-03 ENCOUNTER — AMBULATORY - HEALTHEAST (OUTPATIENT)
Dept: OTHER | Facility: CLINIC | Age: 76
End: 2021-06-03

## 2021-06-03 ENCOUNTER — DOCUMENTATION ONLY (OUTPATIENT)
Dept: OTHER | Facility: CLINIC | Age: 76
End: 2021-06-03

## 2021-06-03 VITALS
HEART RATE: 71 BPM | DIASTOLIC BLOOD PRESSURE: 69 MMHG | BODY MASS INDEX: 22.4 KG/M2 | WEIGHT: 121.5 LBS | SYSTOLIC BLOOD PRESSURE: 147 MMHG | OXYGEN SATURATION: 97 %

## 2021-06-03 VITALS — HEIGHT: 62 IN | BODY MASS INDEX: 22.07 KG/M2 | WEIGHT: 119.9 LBS

## 2021-06-03 VITALS — WEIGHT: 121.2 LBS | BODY MASS INDEX: 22.17 KG/M2

## 2021-06-03 NOTE — TELEPHONE ENCOUNTER
Patient  calling. [phone]    He is calling for his wife, who has been noticing mucous in her stool.   They are calling from [place] , where they reside in the winter months.    RN writer unable to triage caller , due to license , only available for [place]. And [place]. Callers .    Please let patient  know if this can wait until until December , when they will be back in [place]. For the holidays.    No bleeding, just mucous in stool.    Samreen Patricia RN  [facility] Triage/refill nurse

## 2021-06-03 NOTE — TELEPHONE ENCOUNTER
This could be infection; I would suggest evaluation with a gastroenterologist; if they want to wait until she returns to [place], that should be okay as long as no other symptoms. If blood starts, they need to be seen there urgently

## 2021-06-03 NOTE — TELEPHONE ENCOUNTER
She has not had any blood.  FYI - They also wanted you to know that she has been having episodes of diarrhea but they are thinking it might be a side effect from Galantamine (this was recently stopped)  She also had diarrhea from Aricept.

## 2021-06-04 VITALS
RESPIRATION RATE: 16 BRPM | BODY MASS INDEX: 22.45 KG/M2 | DIASTOLIC BLOOD PRESSURE: 80 MMHG | WEIGHT: 122 LBS | HEIGHT: 62 IN | SYSTOLIC BLOOD PRESSURE: 126 MMHG | HEART RATE: 72 BPM

## 2021-06-04 VITALS
DIASTOLIC BLOOD PRESSURE: 68 MMHG | HEART RATE: 70 BPM | WEIGHT: 121.5 LBS | SYSTOLIC BLOOD PRESSURE: 110 MMHG | BODY MASS INDEX: 22.22 KG/M2

## 2021-06-04 VITALS
OXYGEN SATURATION: 96 % | BODY MASS INDEX: 22.93 KG/M2 | SYSTOLIC BLOOD PRESSURE: 126 MMHG | WEIGHT: 124.38 LBS | HEART RATE: 67 BPM | DIASTOLIC BLOOD PRESSURE: 66 MMHG

## 2021-06-04 VITALS
TEMPERATURE: 98.5 F | DIASTOLIC BLOOD PRESSURE: 64 MMHG | HEART RATE: 82 BPM | OXYGEN SATURATION: 99 % | SYSTOLIC BLOOD PRESSURE: 108 MMHG | WEIGHT: 123.9 LBS | RESPIRATION RATE: 20 BRPM | BODY MASS INDEX: 22.85 KG/M2

## 2021-06-04 VITALS — BODY MASS INDEX: 21.58 KG/M2 | WEIGHT: 118 LBS

## 2021-06-05 ENCOUNTER — RECORDS - HEALTHEAST (OUTPATIENT)
Dept: LAB | Facility: CLINIC | Age: 76
End: 2021-06-05

## 2021-06-05 VITALS
WEIGHT: 122.4 LBS | BODY MASS INDEX: 22.52 KG/M2 | SYSTOLIC BLOOD PRESSURE: 124 MMHG | DIASTOLIC BLOOD PRESSURE: 68 MMHG | HEIGHT: 62 IN | HEART RATE: 76 BPM

## 2021-06-05 VITALS
WEIGHT: 128 LBS | OXYGEN SATURATION: 99 % | BODY MASS INDEX: 23.79 KG/M2 | TEMPERATURE: 97.6 F | SYSTOLIC BLOOD PRESSURE: 124 MMHG | HEART RATE: 72 BPM | DIASTOLIC BLOOD PRESSURE: 67 MMHG

## 2021-06-05 VITALS
DIASTOLIC BLOOD PRESSURE: 64 MMHG | SYSTOLIC BLOOD PRESSURE: 118 MMHG | WEIGHT: 123.8 LBS | BODY MASS INDEX: 23.01 KG/M2 | HEART RATE: 72 BPM

## 2021-06-05 VITALS
WEIGHT: 124.1 LBS | BODY MASS INDEX: 22.84 KG/M2 | SYSTOLIC BLOOD PRESSURE: 118 MMHG | HEIGHT: 62 IN | DIASTOLIC BLOOD PRESSURE: 68 MMHG

## 2021-06-05 DIAGNOSIS — E78.00 PURE HYPERCHOLESTEROLEMIA: ICD-10-CM

## 2021-06-06 NOTE — PROGRESS NOTES
Assessment & Plan:       1. Influenza-like illness  oseltamivir (TAMIFLU) 75 MG capsule   2. Acute bronchitis, unspecified organism  XR Chest 2 Views    Influenza A/B Rapid Test     Medical Decision Making  Patient presents with worsening cough and body aches for 3 days most consistent with an influenza-like illness.  However patient tested negative for flu swab for the second time within this week.  Also had concerns for possible pneumonia, however chest x-ray is negative at this time.  Feel the patient most likely still has signs most consistent with influenza now resulting in possible acute bronchitis.  Patient was previously started on Augmentin for suspicion of sinus infection, instructed her to continue the antibiotics until completion.  Patient should also continue with nasal steroid spray.  Will have patient start on oral antivirals as symptoms have been gradually worsening over the last 3 days.  Continue with conservative management of fluids, rest, and over-the-counter analgesics.  Discussed signs of worsening symptoms and when to follow-up with PCP if no symptom improvement.    Patient Instructions   You were seen today for flu-like symptoms. Although the rapid influenza test was negative, this is still most likely due to a virus. You are contagious until your fever is gone for 24 hours. Maintain good hand hygiene, cover your cough, and limit contact to prevent spreading the illness. Symptoms typically last 1-2 weeks.    Symptom management:  - Drink plenty of fluids and allow for plenty of rest  - Use tylenol or ibuprofen alternate every 3 hours as needed for fever/discomfort    Reasons to be seen immediately for re-evaluation:  - Have trouble breathing or are short of breath  - Feel pain or pressure in your chest or belly  - Get suddenly dizzy  - Feel confused  - Have severe vomiting    If no symptom improvement in 1 week, follow-up with your primary care provider.            Subjective:       Roselyn OWEN  Jose is a 74 y.o. female here for evaluation of ongoing cough, sore throat, and sinus congestion.  Symptoms began 1 week ago.  Patient initially developed a sore throat.  She then began to have body aches and worsening cough over the last 3 to 4 days.  Patient also notes a lot of fatigue and has been mostly sleeping over the last 2 days.  They flew back early from [place] yesterday.  Patient did get a flu shot this year.  She was tested for influenza 3 days ago when cough initially worsened and was negative at that time.  However, they did diagnose her with suspected sinus infection and started on oral Augmentin.  She has noticed some looser stools and stomach upset since then.    The following portions of the patient's history were reviewed and updated as appropriate: allergies, current medications and problem list.    Review of Systems  Pertinent items are noted in HPI.     Allergies  No Known Allergies    No family history on file.    Social History     Socioeconomic History     Marital status:      Spouse name: Adolfo     Number of children: 2     Years of education: None     Highest education level: None   Occupational History     Occupation: retired   Social Needs     Financial resource strain: None     Food insecurity:     Worry: None     Inability: None     Transportation needs:     Medical: None     Non-medical: None   Tobacco Use     Smoking status: Never Smoker     Smokeless tobacco: Never Used   Substance and Sexual Activity     Alcohol use: No     Drug use: No     Sexual activity: Yes     Partners: Male     Birth control/protection: Post-menopausal   Lifestyle     Physical activity:     Days per week: None     Minutes per session: None     Stress: None   Relationships     Social connections:     Talks on phone: None     Gets together: None     Attends [employer] service: None     Active member of club or organization: None     Attends meetings of clubs or organizations: None     Relationship status:  None     Intimate partner violence:     Fear of current or ex partner: None     Emotionally abused: None     Physically abused: None     Forced sexual activity: None   Other Topics Concern     None   Social History Narrative    Restorationism         Objective:       /64 (Patient Site: Right Arm, Patient Position: Sitting, Cuff Size: Adult Regular)   Pulse 82   Temp 98.5  F (36.9  C) (Oral)   Resp 20   Wt 123 lb 14.4 oz (56.2 kg)   LMP 07/29/1972   SpO2 99%   BMI 22.85 kg/m    General appearance: alert, appears stated age, cooperative, no distress and non-toxic  Head: Normocephalic, without obvious abnormality, atraumatic, sinuses nontender to percussion  Ears: TMs intact with mucoid fluid and mild bulging, no erythema  Nose: no discharge  Throat: lips, mucosa, and tongue normal; teeth and gums normal  Neck: no adenopathy and supple, symmetrical, trachea midline  Lungs: clear to auscultation bilaterally  Heart: regular rate and rhythm, S1, S2 normal, no murmur, click, rub or gallop     Lab Results    Recent Results (from the past 24 hour(s))   Influenza A/B Rapid Test   Result Value Ref Range    Influenza  A, Rapid Antigen No Influenza A antigen detected No Influenza A antigen detected    Influenza B, Rapid Antigen No Influenza B antigen detected No Influenza B antigen detected     I personally reviewed these results and discussed findings with the patient.    Imaging    Xr Chest 2 Views    Result Date: 3/6/2020  EXAM: XR CHEST 2 VIEWS LOCATION: [facility] DATE/TIME: 3/6/2020 3:18 PM INDICATION: Cough with right lower chest wall pain. COMPARISON: None.     Mild central predominant airway thickening suggesting bronchitis. The lungs are clear. No consolidation. No pleural effusion or pneumothorax. Normal heart size. Mild pectus excavatum deformity.     I personally reviewed the results, which showed no signs of pneumonia. Discussed findings with the patient.

## 2021-06-06 NOTE — PATIENT INSTRUCTIONS - HE
You were seen today for flu-like symptoms. Although the rapid influenza test was negative, this is still most likely due to a virus. You are contagious until your fever is gone for 24 hours. Maintain good hand hygiene, cover your cough, and limit contact to prevent spreading the illness. Symptoms typically last 1-2 weeks.    Symptom management:  - Drink plenty of fluids and allow for plenty of rest  - Use tylenol or ibuprofen alternate every 3 hours as needed for fever/discomfort    Reasons to be seen immediately for re-evaluation:  - Have trouble breathing or are short of breath  - Feel pain or pressure in your chest or belly  - Get suddenly dizzy  - Feel confused  - Have severe vomiting    If no symptom improvement in 1 week, follow-up with your primary care provider.       29-Sep-2018

## 2021-06-07 NOTE — TELEPHONE ENCOUNTER
Refill Approved    Rx renewed per Medication Renewal Policy. Medication was last renewed on 8/10/19.    Alexa Richmond, [facility] Triage/Med Refill 4/21/2020     Requested Prescriptions   Pending Prescriptions Disp Refills     citalopram (CELEXA) 10 MG tablet [Pharmacy Med Name: CITALOPRAM 10MG TABLETS] 90 tablet 2     Sig: TAKE 1 TABLET BY MOUTH DAILY AS DIRECTED. GENERIC EQUIVALENT FOR CELEXA       [facility] Refill Protocol  Passed - 4/19/2020  1:42 PM        Passed - PCP or prescribing provider visit in last year     Last office visit with prescriber/PCP: 9/30/2019 Niurka Austin MD OR same dept: 9/30/2019 Niurka Austin MD OR same specialty: 9/30/2019 Niurka Austin MD  Last physical: 8/21/2019 Last MTM visit: Visit date not found   Next visit within 3 mo: Visit date not found  Next physical within 3 mo: Visit date not found  Prescriber OR PCP: Niurka Austin MD  Last diagnosis associated with med order: 1. Menopausal Disorder  - citalopram (CELEXA) 10 MG tablet [Pharmacy Med Name: CITALOPRAM 10MG TABLETS]; TAKE 1 TABLET BY MOUTH DAILY AS DIRECTED. GENERIC EQUIVALENT FOR CELEXA  Dispense: 90 tablet; Refill: 2    2. Anxiety  - citalopram (CELEXA) 10 MG tablet [Pharmacy Med Name: CITALOPRAM 10MG TABLETS]; TAKE 1 TABLET BY MOUTH DAILY AS DIRECTED. GENERIC EQUIVALENT FOR CELEXA  Dispense: 90 tablet; Refill: 2    If protocol passes may refill for 12 months if within 3 months of last provider visit (or a total of 15 months).

## 2021-06-08 NOTE — TELEPHONE ENCOUNTER
"Call placed to patients  to see how she was feeling as have not had any recommendations back from Dr. Forrester yet. States she again seems pretty consistent with this pain with deep breathing, feels she cannot get full breath. Not in distress.  is hesitant to take her back to ER again for 4th time, especially with protesting going on downtown. States he is torn on what to do because she isn't the best with describing symptoms, but is pretty adamant that she has pain/pressure with deep breaths that was not there before pacemaker was placed.     Advised  that he could try reaching an on-call provider to discuss. Page sent out to Dr. Forrester earlier but have not heard back yet and clinic is closing. Also advised that maybe Urgent care facility or [facility] ER might be an easier place to start if [facility] is too difficult for him right now. Discussed that the pacemaker was checked earlier and in good function, however, it is not \"normal\" to have these symptoms after device placement.   verbalized understanding.  He will contemplate these options and decide which is best/safest plan and appreciates the help.     Damaris Herman RN    "

## 2021-06-08 NOTE — TELEPHONE ENCOUNTER
"RN triage call from  Huang  Consent to communicate in chart  Seen in ER yesterday [facility] ED  Dx  Dehydration, diarrhea nausea (had vomiting, diarrhea, chills hot flashes rapid breathing)  Labs came back essentially normal  Ct scan of abdomen and pelvis normal  At home patient has not had diarrhea or vomiting  Continues with chills and hot flashes  This morning patient was sitting up on the couch stated \"I'm so cold\"  Huang went to cover her up, she did not respond, she was staring out in space, her head lulled to one side  She came around and did not remember what had happened.  Huang got her to the kitchen table to try and eat and drink   He heard a clunk and turned around and patient had fallen out of her seat and hit her head  Now laying in bed states she feels better.  Huang wonders if return visit to ED is advised  Gave disposition for 911  Huang declined and would like PCP to advise  He will be getting her ready to take to the ED if advised.  Please call on cell phone with advise  Encouraged to call 911 due to patients repeated loss of consciousness this morning.    Nissa Lake, RN  [facility] Triage Nurse  10:08 AM  5/29/2020          Reason for Disposition    Difficult to awaken or acting confused (e.g., disoriented, slurred speech)    Protocols used: NEUROLOGIC DEFICIT-A-OH      "

## 2021-06-08 NOTE — TELEPHONE ENCOUNTER
I spoke to Adolfo and Michelle.  Informed them that I will defer medical decisions to their PCP.  It was a pleasure to have been a part of Michelle's care.

## 2021-06-08 NOTE — PROGRESS NOTES
"Roselyn Garcia is a 74 y.o. female who is being evaluated via a billable video visit.      The patient has been notified of following:     \"This video visit will be conducted via a call between you and your physician/provider. We have found that certain health care needs can be provided without the need for an in-person physical exam.  This service lets us provide the care you need with a video conversation.  If a prescription is necessary we can send it directly to your pharmacy.  If lab work is needed we can place an order for that and you can then stop by our lab to have the test done at a later time.    Video visits are billed at different rates depending on your insurance coverage. Please reach out to your insurance provider with any questions.    If during the course of the call the physician/provider feels a video visit is not appropriate, you will not be charged for this service.\"    Patient has given verbal consent to a Video visit? Yes    Patient would like to receive their AVS by AVS Preference: Kwesi.    Patient would like the video invitation sent by: Text to cell phone: [phone]    Will anyone else be joining your video visit? Yes: Adolfo ( ). How would they like to receive their invitation? Text to cell phone: [phone]        Video Start Time: 1:05 PM    Additional provider notes:    Roselyn Garcia 74 y.o.. female with   Chief Complaint   Patient presents with     Follow-up     5/20/20 anal discharge still the same     Bursitis     right leg still the some     memory     very forgetful,  said that her menory has changed a lot from last year        S:  4-5 years ago  noted more frequent forgetfulness, difficulty following recipes, misplacing things, confusion  3 years ago brought to attention of PCP. Put on levothyroxine  2 years ago saw Dr Kong & w/u & was given diagnosis \"mild cognitive impairment\".  MRI 2018 showing mild generalized cerebral and cerebellar volume loss " with mild sequela of chronic small vessel ischemic disease.  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  Still doing craft  Repeats questions, stories, or statements  Trouble learning how to use a tool or appliance  Forgetting the correct month or year  Difficulty handling financial affairs (bill-paying, taxes)  Difficulty remembering appointments  Consistent problems with thinking and/or memory  No h/o head trauma  No problems with sleep, energy, appetite, bowel movements, hot flashes, or mood.  Started on E2 and P4 about 20 years ago  On vitamin D 1600iu  FMH:  Mom had dementia.  Sister recent diagnosis of dementia.  hypothyrodism in both family members     Recent labs:  TSH=2.58, fT3=3, fT4=1  E2=76, P4=4.4, tT=12, fT=0.08  P64=772    Previous labs:  Vitamin D (2018)=47.5  TSH (2016)=6.33, (2011)=3.12    O:  Constitutional: Patient is awake, alert, smiling, pleasant. Patient appears well-developed and well-nourished. No distress. Accompanied by  for video visit.  Head: Normocephalic and atraumatic.   Right Ear: External ear normal.   Left Ear: External ear normal.   Nose: Nose normal.   Eyes: Conjunctivae and EOM are normal. Right eye exhibits no discharge. Left eye exhibits no discharge. No scleral icterus.   Neurological: Patient is alert and oriented to person, place, and time.   The patient has good eye contact.  No psychomotor retardation or stereotypical behaviors.  Speech was regular rate, regular rhythm, adequate responses.  Mood was stable and affect was congruent mood.  No suicidal or homicidal intent.  No hallucination.  Able to recognize person ( and current president)  Unable to state address  Unable to state day of the week, month, and year  Unable to recall what she ate for breakfast this morning and for last night's dinner  Unable to recall three words for  assessment of short term memory:  Car, computer, sun  Unable to draw analog clock for visuospatial memory      A/P:    Mild neurocognitive disorder, h/o TSH test abnormal  1.  There's not enough data in her chart to conclusively diagnose hypothyroidism; nonetheless will add armour thyroid for off label use for memory symptoms.  Add armour thyroid to levothyroxine.  Start with 1/2 grain of armour and anticipate wean off levothyroxine when she finishes her current prescription  2.  Add B12 1000mcg orally. To buy OTC.  May need SQ if with poor oral absorption  3.  Hold statin for now  4.  Continue with E2 and P4. Consider optimization in the near future.  Consider adding Testosterone  5.  Continue with D3 1600iu for now.  Consider increasing the dose  6.  Precautions:  Working carbon monoxide/gas/fire detector at home.  Advanced directives.  Fall precautions.  Nutritional support.    7.  F/u in 2-4 wks    Video-Visit Details    Type of service:  Video Visit    Video End Time (time video stopped): 2:28 PM  Originating Location (pt. Location): Home    Distant Location (provider location):  [facility]     Platform used for Video Visit: Grzegorz Manuel MD

## 2021-06-08 NOTE — PROGRESS NOTES
"    TCM DISCHARGE FOLLOW UP CALL      \"Hi, my name is  Braxton, and I am calling from  [facility].  I am calling to follow up and see how things are going for you after your recent hospitalization.      Tell me how you are doing now that you are home?\" Been having a rough recovery, feeling tired and exhausted. Eating and drinking well. Sleeping about 12 hours a day between naps and sleeping at night. There has been some improvement since the discharge date.      Discharge Instructions     Do you understand your discharge instructions?  Pt. Response: Yes      \"Has an appointment with your primary care provider been scheduled?\" Yes  \"If yes, when is that appointment?  6/8/20      Medications    \"Did you have any medication changes?   No   Do you have any concerns with obtaining the medications or questions about your medications that you would like a RN to review with you?  No  **If yes, escalate to CC RN responsible for patient's clinic or [facility] RN  Send an inbasket Epic message if RN is unavailable after call.     \"When you see the provider, I would recommend that you bring your medications with you.\"    Call Summary    \"What questions or concerns do you have about your recent visit and your follow-up care?\"Does not have anything             Can be addressed if scheduled with RN or SW either Care Coordination or if declining to triage for further questions.         \"If you have questions or things don't continue to improve, we encourage you contact us through the main clinic number _______________ (give number).  Even if the clinic is not open, triage nurses are available 24/7 to help you.               I am with Clinic Care Coordination, and we are a team of nurses, social workers, community health workers, and financial resource workers. We work together with your primary doctor.   We are here to see if we can help you with a variety of things - from resources in the community such as food assistance, " transportation, help in the home, equipment needs, assistance with medications, coordination of your appointments, help with any medical questions, and things like this.      We would have a nurse or  speak with you and together come up with goals to help you to improve your health and wellness and do the best to help you stay out of the hospital.

## 2021-06-08 NOTE — TELEPHONE ENCOUNTER
New Appointment Needed  What is the reason for the visit:    Same Date/Next Day Appt Request  What is the reason for your visit?: Discuss patients memory issues -- See below    Provider Preference: Dr. Diggs  How soon do you need to be seen?: asap  Waitlist offered?: No  Okay to leave a detailed message:  Yes    Dr. Diggs saw patient on 5/20 at [facility] for a in clinic visit.     Did Dr. Diggs want to see the patient in the clinic for the follow up? Writer didn't want to schedule telephone or virtual visit if a in clinic visit was more appropriate.     Please call and advise.

## 2021-06-08 NOTE — PROGRESS NOTES
"Roselyn Garcia is a 74 y.o. female who is being evaluated via a billable telephone visit.      The patient has been notified of following:     \"This telephone visit will be conducted via a call between you and your physician/provider. We have found that certain health care needs can be provided without the need for a physical exam.  This service lets us provide the care you need with a short phone conversation.  If a prescription is necessary we can send it directly to your pharmacy.  If lab work is needed we can place an order for that and you can then stop by our lab to have the test done at a later time.    Telephone visits are billed at different rates depending on your insurance coverage. During this emergency period, for some insurers they may be billed the same as an in-person visit.  Please reach out to your insurance provider with any questions.    If during the course of the call the physician/provider feels a telephone visit is not appropriate, you will not be charged for this service.\"    Patient has given verbal consent to a Telephone visit? Yes    What phone number would you like to be contacted at? [phone]    Patient would like to receive their AVS by AVS Preference: Kwesi.    Additional provider notes: Post Discharge Medication Reconciliation Status: discharge medications reconciled, continue medications without change     The patient is on the phone with her .  She had an emergency room visit for possible dehydration and the next day she had 2 syncopal episodes so she was taken back to the emergency room and admitted to the hospital.  During that hospitalization while telemetry was being done, she had a 10-second pause and an 18-second pause.  She was sent to [facility] in [place] for a pacemaker placement.  She has had several pacemaker interrogations and things seem to be working really well.  She feels a lot better and has a lot more energy and is able to walk 2 to 3 " miles every day since this happened.    While in the hospital did not change any of her medications.    On 27 May she had a telephone visit with a different family doctor who had seen her for some anal leakage, trochanteric bursitis on the right and they started talking about the patient's dementia.  I had done a metabolic work-up and sent her to the dementia clinic at [place].  The medications she tried for her dementia did not seem to help and she had some untoward side effects so she stopped them.  The doctor whom she saw earlier in May suggested that she stop her pravastatin, continue on her hormones of estrogen progesterone, and perhaps do Newcomb Thyroid instead of the levothyroxine I had her on.  They had a follow-up appointment with that physician on May 27 but they have not given her the Newcomb Thyroid yet because there was a recall.  When I review her TSH between 5/20/2020 and 531 of 2020 there is a significant difference I am not sure what the statistical significance is and so I might be inclined to change the medication but I would likely stick with levothyroxine, brand name Synthroid, or add Cytomel instead of doing Newcomb Thyroid which has a combination of T4 and T3 and it is difficult to calculate the mixture.  What they ultimately decided to do was stick to the levothyroxine she is on and recheck the thyroid labs the beginning of July and then we can do adjustments from there either with levothyroxine, brand name Synthroid, or add some Cytomel which is T3.    She also was instructed to take some B12 and I think with her B12 levels what they were at the time it is not at all unreasonable to add B12 to her regimen in addition to the B6 which she is taking.  We opted not to change her citalopram because the anxiety seems to be well managed.    They had been instructed to stop the pravastatin but after couple of days the patient's  was very uneasy about that and restarted that.  When I read  through the adverse effects of pravastatin, cognitive detriment is less than 1% of the time so I am not sure that that is really contributing to her dementia.  Because her cholesterol typically is very high, in the high 200s to 300 range, they both feel better having her cholesterol in a more reasonable range.  Her CT calcium score couple years ago was 0 but we still are worried about the neuro vasculature.    Her bursitis in her right hip starts out a little uncomfortable when she starts to walk but as she continues to walk it gets better.  I would typically order physical therapy with possible iontophoresis or ultrasound so put an order in ow for when they start doing hands-on face-to-face visits.  In the meantime if it gets too difficult, she could go back and have a steroid injection in the trochanteric bursa.    The anal discharge seems to be only after she has a stool and the amount that she has varies.  Her last colonoscopy was August 2016 and showed only some mild diverticulosis and internal hemorrhoids.  I have suggested they discuss this with a colorectal surgeon to see if she needs a flexible sigmoidoscope exam or if they would have a way to do some pelvic floor physical therapy.  She already has an InterStim for her bladder sweats likely related to the neurovascular issues by her bladder and anal sphincter.    They wanted to discuss whether or not to get a second opinion from a different neurologist with a different health system since [facility] since [facility].  Since I am not an expert in dementia management, I think at least talking to somebody to see what other options they have for treatment and slowing the progression of her dementia is worthwhile.  They both were trying to latch onto any sort of hope that this other doctor could give them by looking at the metabolic issues and the hormone levels.    They felt very comfortable after our conversation and are going  to continue to do the walking and perhaps trying ibuprofen either before or after the walk, 1 tablet, for about a week and see how that helps the bursitis.  They call me back later to say they are willing to have a referral to a different neurologist to see what else he has to offer.  They are willing to stay on the pravastatin and B12.  New    They are not going to go on to the [place] Thyroid they can continue the levothyroxine as it is and just recheck her labs in about a month to see if we want to make some adjustments.  And I will send him to the colorectal surgeon for the persistent anal mucousy discharge.      Assessment/Plan:  1. Hospital discharge follow-up  Pacemaker.  Has Cardiology follow up tomorrow    2. Hypothyroidism, unspecified type     - T4, Free; Future  - Thyroid Stimulating Hormone (TSH); Future  - T3 (Triiodothyronine), Free; Future    3. Trochanteric bursitis of right hip     - Ambulatory referral to PT/OT    4. Anal discharge     - Ambulatory referral to Colorectal Surgery    5. Dementia without behavioral disturbance, unspecified dementia type (H)     - Ambulatory referral to Neurology        Phone call duration:  37 minutes    Niurka Austin MD

## 2021-06-08 NOTE — TELEPHONE ENCOUNTER
"LVM for callback re: site concerns.  called back stating that she has been complaining of soreness/pain with deep breaths. Was feeling quite nauseated this am too.  states he gave her some Tylenol and Zofran earlier.  States she has been fairly tired today too, sleeping a lot more than usual. He was able to wake her up and she is coherent (to her baseline), conversational and answering questions. States she does not appear to be in distress, but again just feels like she cannot take a full breath due to tightness/pain, and per  she is pointing to her sternum.        Does not have a pulse oximeter at home, does have BP cuff. Had them take reading and was /59, HR 77. Had them send device transmission which was good.  does not feel it is \"Severe enough\" to go to ER but is quite concerned about this change from yesterday. Is giving her more tylenol but worried that she cannot take a full breath.  States she has been in/out of ER 3 times in last couple days.     Advised if incraesed shortness of breath, chest pain, lightheadedness occur to go back to ER. Patient agrees. In meantime will review with Dr. Forrester for further recommendations.       Type: pt initiated (pain with deep breaths)  Presenting Rhythm: AS/VS, rates: 94bpm  Lead/Battery status: stable  Arrhythmias: none  Comments: normal device function. jtr  AP <1%,  <1%.    Damaris Herman RN      "

## 2021-06-08 NOTE — TELEPHONE ENCOUNTER
Called cell phone number for Huang and no answer. Phone did not ring at all.  Nissa Lake RN  [facility] Triage Nurse  10:32 AM  5/29/2020    Called again and no answer. Phone did not ring.  Nissa Lake RN  [facility] Triage Nurse  10:33 AM  5/29/2020    Review of chart shows patient at ED now.  Nissa Lake RN  [facility] Triage Nurse  10:33 AM  5/29/2020

## 2021-06-08 NOTE — PROGRESS NOTES
ASSESSMENT/PLAN:  Incontinence of feces  Difficult to discern duration and frequency as patient has memory issue  Colonoscopy 2016 and fecal occult 2019 did not indicate any structural pathology  Will do stool studies  May be related to her cognitive impairment  Consider evaluation for urinary causes  -     C. difficile Toxigenic by PCR; Future  -     Cryptosporidium/Giardia Combo, Stool; Future  -     Ova and Parasite, Stool; Future  -     Rotavirus Antigen, Stool; Future  -     Culture, Stool; Future  -     H. pylori Antigen, Stool(HPSAG); Future    Acquired hypothyroidism  On levothyroxine 50mcg  -     T3 (Triiodothyronine), Free  -     T4, Free  -     Thyroid Stimulating Hormone (TSH)    Cognitive change  MRI 2018 showing mild generalized cerebral and cerebellar volume loss with mild sequela of chronic small vessel ischemic disease  Tried aricept, galantamine, rivastigmine but had side effects  Currently on E2 1mg, P4 100mg, T4 50mcg; consider optimizing hormones  Consider trial off lipitor  Will check labs and ask her to f/u to discuss treatment  -     Comprehensive Metabolic Panel  -     Estradiol  -     HM2(CBC w/o Differential)  -     Progesterone  -     Testosterone, Free and Total (BTEST)  -     Vitamin B12  -     Lyme Antibody [place]    Trochanteric bursitis of right hip  Discussed supportive cares and OTC analgesics  Avoid sleeping on the right side  Consider steroid injection if worsening  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 female who came in today with her .  Patient was very tearful about her inability to describe her symptoms and remember details.  She asked that her  be the historian.  Even when he gives his version of the patient's symptoms, details were difficult to ascertain    Several months of fecal incontinence/leakage  Stool and mucus combination  Daily bowel movements; variable characteristic, sometimes soft, liquid, form, or  pellets  No blood  Not sure if daily.  Thinks it may be on/off  Not sure if she has rectal pain  Not sure if related to exertion and/or urination  Not sure if with Prolapse  Sometimes with Itchiness  No Weight loss  No Abdominal pain  No Fever  Hemoglobin 2018 was 14.5  Colonoscopy 2016 showed sigmoid diverticulosis with internal/external hemorrhoids  Negative fecal occult 2019  No h/o polyp  No known FMH of colon CA or inflammatory bowel disease  No change in diet    Has chronic h/o urinary urgency and urge incontinence and has had couple of surgeries for the bladder  S/p hysterectomy    Right hip/leg pain x 2-4wks   Pain mostly on the outer side of the leg but sometimes complain of pain along the right groin  No known trauma  No problems with weight bearing activities  Tylenol prn for the pain    Years of memory concerns  Very stressful and sad for patient and   Has seen Dr Guajardo with w/u  MRI 2018 showing mild generalized cerebral and cerebellar volume loss with mild sequela of chronic small vessel ischemic disease  Tried aricept, galantamine, rivastigmine but had side effects  Everyone has given up on her; she expresses hopelessness  Taking vitamin B6    Review of Systems (except those mentioned above)  Constitutional: Negative.   HENT: Negative.   Eyes: Negative.   Respiratory: Negative.   Cardiovascular: Negative.   Gastrointestinal: Negative.   Endocrine: Negative.   Genitourinary: Negative.   Musculoskeletal: Negative.   Skin: Negative.   Allergic/Immunologic: Negative.   Neurological: Negative.   Hematological: Negative.   Psychiatric/Behavioral: Negative.     Patient Active Problem List    Diagnosis Date Noted     Sensorineural hearing loss (SNHL) of left ear with restricted hearing of right ear 08/22/2018     Mild neurocognitive disorder 07/11/2018     Elevated TSH 08/24/2016     Essential Hypercholesterolemia      Osteopenia      Migraine Headache      Menopausal Disorder      Imaging Studies  Nonspecific Abnormal Findings Breast      No Known Allergies  Current Outpatient Medications   Medication Sig Dispense Refill     aspirin 81 MG EC tablet Take 81 mg by mouth daily.              calcium-vitamin D (CALCIUM-VITAMIN D) 500 mg(1,250mg) -200 unit per tablet Take 1 tablet by mouth 2 (two) times a day with meals.       citalopram (CELEXA) 10 MG tablet TAKE 1 TABLET BY MOUTH DAILY AS DIRECTED. GENERIC EQUIVALENT FOR CELEXA 90 tablet 1     estradiol (ESTRACE) 1 MG tablet Take 1 tablet (1 mg total) by mouth 2 (two) times a day. (Patient taking differently: Take 1 mg by mouth daily. ) 180 tablet 3     lactobacillus rhamnosus GG (CULTURELLE) 10-15 Billion cell capsule Take 1 capsule by mouth daily.       levothyroxine (SYNTHROID, LEVOTHROID) 50 MCG tablet Take 1 tablet (50 mcg total) by mouth daily. 90 tablet 3     pravastatin (PRAVACHOL) 20 MG tablet Take 1 tablet (20 mg total) by mouth every evening. 90 tablet 3     progesterone (PROMETRIUM) 100 MG capsule Take 1 capsule (100 mg total) by mouth at bedtime. 90 capsule 3     pyridoxine, vitamin B6, (VITAMIN B-6) 100 MG tablet Take 200 mg by mouth daily.       SUMAtriptan (IMITREX) 25 MG tablet Take 1 tablet (25 mg total) by mouth every 2 (two) hours as needed for migraine. 10 tablet 5     vitamin E 1000 UNIT capsule Take 2,000 Units by mouth daily.       No current facility-administered medications for this visit.      No past medical history on file.  Past Surgical History:   Procedure Laterality Date     BREAST EXCISIONAL BIOPSY Right     While ago     HYSTERECTOMY  1972     IN APPENDECTOMY      Description: Appendectomy;  Recorded: 10/08/2008;     IN BREAST SURGERY PROCEDURE UNLISTED      Description: Breast Surgery;  Recorded: 10/08/2008;     IN REMOVAL OF TONSILS,<13 Y/O      Description: Tonsillectomy;  Recorded: 10/08/2008;     IN REV UPPER EYELID W EXCESS SKIN      Description: Blepharoplasty Upper Lid W/ Excessive Skin;  Recorded: 08/02/2013;     IN  VAGINAL HYSTERECTOMY,UTERUS 250 GMS/<      Description: Vaginal Hysterectomy;  Recorded: 08/19/2013;  Comments: prolapse.....has ovaries     NM VAGINAL HYSTERECTOMY,UTERUS 250 GMS/<      Description: Vaginal Hysterectomy;  Recorded: 08/15/2014;  Comments: prolapse.....has ovaries     SACRAL NERVE STIMULATOR PLACEMENT  07/31/2013    Dr Campo     Social History     Socioeconomic History     Marital status:      Spouse name: Adolfo     Number of children: 2     Years of education: None     Highest education level: None   Occupational History     Occupation: retired   Social Needs     Financial resource strain: None     Food insecurity     Worry: None     Inability: None     Transportation needs     Medical: None     Non-medical: None   Tobacco Use     Smoking status: Never Smoker     Smokeless tobacco: Never Used   Substance and Sexual Activity     Alcohol use: No     Drug use: No     Sexual activity: Yes     Partners: Male     Birth control/protection: Post-menopausal   Lifestyle     Physical activity     Days per week: None     Minutes per session: None     Stress: None   Relationships     Social connections     Talks on phone: None     Gets together: None     Attends Rastafarian service: None     Active member of club or organization: None     Attends meetings of clubs or organizations: None     Relationship status: None     Intimate partner violence     Fear of current or ex partner: None     Emotionally abused: None     Physically abused: None     Forced sexual activity: None   Other Topics Concern     None   Social History Narrative    Buddhist     No family history on file.      OBJECTIVE:    Vitals:    05/20/20 1014   BP: 126/66   Pulse: 67   SpO2: 96%   Weight: 124 lb 6 oz (56.4 kg)     Body mass index is 22.93 kg/m .    Physical Exam:  Constitutional: Patient was awake, alert, pleasant. Patient appeared well-developed and well-nourished. No distress.   Head: Normocephalic and atraumatic.   Right Ear:  External ear normal.   Left Ear: External ear normal.   Nose: Nose normal.   Eyes: Conjunctivae and EOM were normal. Right eye exhibited no discharge. Left eye exhibited no discharge. No scleral icterus.   Skin: Skin was warm and dry. No rash noted. Patient was not diaphoretic. No erythema. No pallor.   Tenderness to palpation of the right trochanter bursa  The patient has good eye contact.  No psychomotor retardation or stereotypical behaviors.  Speech was regular rate, regular rhythm, adequate responses.  Mood was tearful and affect was congruent mood.       Results for orders placed or performed in visit on 05/20/20   HM2(CBC w/o Differential)   Result Value Ref Range    WBC 5.8 4.0 - 11.0 thou/uL    RBC 4.88 3.80 - 5.40 mill/uL    Hemoglobin 14.8 12.0 - 16.0 g/dL    Hematocrit 44.7 35.0 - 47.0 %    MCV 92 80 - 100 fL    MCH 30.3 27.0 - 34.0 pg    MCHC 33.1 32.0 - 36.0 g/dL    RDW 11.8 11.0 - 14.5 %    Platelets 307 140 - 440 thou/uL    MPV 7.0 7.0 - 10.0 fL       A total of 45 minutes visit with over 50% of the time spent on counseling about further management for her cognitive function.

## 2021-06-08 NOTE — PATIENT INSTRUCTIONS - HE
Pacemaker Post-operative Checklist      The Device Registered Nurse (RN) reviewed the pacemaker function.      The Device RN did a wound assessment and wound care teaching.    Please call the Device Nurses with any signs of infection or questions regarding wound healing. Device Nurse Line: [phone], Option #3      The Device RN demonstrated and displayed the specific remote monitoring system for your pacemaker.      The Device RN reviewed the Partnership Agreement Form.    Patient Instructions    Do not lift your left arm above the shoulder height, perform any vigorous arm movements such as swimming, golfing, washing windows, shoveling show, vacuuming or lifting greater than 10-15lbs with the affected arm for 4 weeks from the date of implant. Last date of restrictions is June 28, 2020.      To reduce the risk of infection, try to avoid any dental procedures for the first 6 weeks after your pacemaker implant. If you have an emergent or urgent dental need during this time, contact the device clinic for a prescription for an antibiotic.      You will receive a device identification (ID) card in the mail from the device  within 6 weeks to replace the temporary ID card you were given in the hospital.      You may travel by any mode of transportation; just show your pacemaker ID card. You may be subject to a hand search or use of a handheld wand, but official should not keep the wand over the implant site for greater than 5-10 seconds.      For any surgery, let your doctor know you have a pacemaker.       Your pacemaker is MRI safe       Most household appliances, including a microwave, will not interfere with your pacemaker function. If you suspect interference, simply move away from the source. Cell phones do not cause a problem. Please refer to the device booklet from the  or their website under the section on electromagnetic interference (JULISSA) for further guidelines on things that may  interfere with your pacemaker.       Device Clinic Contact Information  Device Nurses: [phone], Option #3    Device Remote Specialists: [phone], Option #2. For questions about your Remote Transmission or Transmission Schedule  Device Schedulers: [phone], Option #1

## 2021-06-08 NOTE — TELEPHONE ENCOUNTER
RN triage   Call from pt  -- signed consent in chart   Pt has been sick since 0600 today --   diarrhea several times -- no blood   Dry heaves and vomiting x4 - clear to white   Chills --   T = 96.6  Weak and shallow breathing - is alert and interacting   Sl headache   Some abd pain between vomiting and diarrhea   Some ' discomfort' w/ breathing  - no cough   U.O. OK   Breathing seems a little better now per    Per protocol = go to ED   Will go to  ED now   Jazmyn Lai RN [facility] RN triage        Reason for Disposition    Vomiting contains bile (green color)    Protocols used: VOMITING-A-OH

## 2021-06-08 NOTE — TELEPHONE ENCOUNTER
"----- Message from Mo Forrester MD sent at 6/1/2020  6:20 PM CDT -----  Hi Team!  Damaris let me know that Michelle was having some inspiratory pain this morning.  I called and she reports no pain this evening. Her  checked her for sternal discomfort at my request. She did not experience any when he pressed.  Can one of you please call them and check  on her for the next couple of days to make sure this resolves.   Thanks  S         Call placed to Michelle's  today to check in. States she is \"still quite tired and a bit out of it, more confused than usual but has had a rough few days.\" States he was able to get her to eat a meal, and go sit in garage to talk with friends/get moving a little. She has not complained of that return of pain with deep breaths, and \" I have not asked her about it. If it bothers her she will mention it, and she has not.\" Advised to call if she starts complaining of this again. He agrees. States he removed the bandage and site looks good but is a bit swollen, about 1/2 size of golf ball. Discussed when to call and what s/s of infection are. Advised to try cool packs to site to reduce swelling in addition to the Tylenol they are trying. Verbalized understanding.     Advised to call with any changes before post-op appointment. He agrees.     Damaris Herman RN    "

## 2021-06-08 NOTE — TELEPHONE ENCOUNTER
"Pt  called EP RN line with questions regarding PPM site.  He has some concerns and would like a callback to discuss and make sure its \"normal\".     PPM implanted yesterday 5/31.    Thank you!    Courtney"

## 2021-06-09 NOTE — TELEPHONE ENCOUNTER
"Dr. Forrester- pt and her  Adolfo are calling our office to report an increase in pain since PPM implant on 5/31/20. Pt had previously called and spoke with Damaris GALLAGHER and yourself on 6/1/2020.  During your phone conversation pt states you had him palpate her chest, first along the sternum, then on either side of it. Michelle reported no pain from that, and they were recommended to watch it for now. Damaris's follow up call 6/2/20 states that Michelle thought she was feeling better.  Virtual visit with Rizwana GALLAGHER on 6/9/2020 went well and pt had no complaint per documentation, PPM site appeared to be healing well.    Adolfo states since then, Michelle has been reporting pain in the same area more often and with more intensity. She states it is worse with exercise. She now has pain with each breath and it increases with deeper breaths. This afternoon, Adolfo palpated her chest the same way you had him do it a few weeks ago. There is now pain upon palpation all the way along the entire length of her sternum as well as on either side of it. The most severe pain is along the area between her breasts.  I had Adolfo look at the pacemaker site, and area that has pain, he denies redness, bruising or inflammation at site.  Pt also states that this has not affected her sleep at all.    Pt has also reached out to her PCP Dr. Austin who stated it sounded like \"pleurisy-inflammation between the lungs and the chest wall.\"    What are your recommendations?    Courtney      "

## 2021-06-09 NOTE — PROGRESS NOTES
I spoke to the patient's  this afternoon after his wife had been evaluated at the [facility] in [facility].  The patient continues to have midsternal chest discomfort which is reproducible with palpation of the sternum.  This typically occurs with respiration or can be reproduced with cough or sneeze.  The evaluation performed today included chest CT which showed no evidence of pulmonary embolus and no significant cardiac findings.  I suspect that the patient has a muscular skeletal source for the chest pain that she is experiencing.  I discussed this with the patient's  and suggested that he follow-up with the patient's primary care Dr. Niurka Austin to determine whether or not she would be a candidate for possible NSAID therapy to see if this improved her symptoms.  Continue with routine device clinic follow-up.

## 2021-06-09 NOTE — TELEPHONE ENCOUNTER
Who is calling:  Patient's spouse  Reason for Call:  Patient went to the [facility] in [place] today, 7/1/20, and they did a chest CT scan, EKG, tested to see if the patient had a heart attack, and labs including the thyroid labs. Patient was told that they should follow up with primary care, because nothing looked abnormal to them. The patient would like to know if the provider would like the patient to be seen in office for a face to face visit or if they should schedule a virtual visit for the follow up. Patient's spouse stated that the [facility] told them the information from the visit for 7/1/20 with them would be viewable via care everywhere.    Has the patient been recently seen:  Yes, by the urgency room  Okay to leave a detailed message: Yes

## 2021-06-09 NOTE — PROGRESS NOTES
"Roselyn Garcia is a 74 y.o. female who is being evaluated via a billable telephone visit.      The patient has been notified of following:     \"This telephone visit will be conducted via a call between you and your physician/provider. We have found that certain health care needs can be provided without the need for a physical exam.  This service lets us provide the care you need with a short phone conversation.  If a prescription is necessary we can send it directly to your pharmacy.  If lab work is needed we can place an order for that and you can then stop by our lab to have the test done at a later time.    Telephone visits are billed at different rates depending on your insurance coverage. During this emergency period, for some insurers they may be billed the same as an in-person visit.  Please reach out to your insurance provider with any questions.    If during the course of the call the physician/provider feels a telephone visit is not appropriate, you will not be charged for this service.\"    Patient has given verbal consent to a Telephone visit? Yes    What phone number would you like to be contacted at? [phone]    Patient would like to receive their AVS by AVS Preference: Kwesi.    Additional provider notes: They had gone to see the gastroenterologist and she had a flexible sigmoidoscope exam done in the office and there is nothing wrong.  At the suggestion to help manage her loose stools was Benefiber so they can start with 1 dose a day for few days then up to twice a day then up to 3 times a day as the bottle directs.  If that does not work then they are to try some Metamucil as a bulking agent.    They had a video visit with the neurologist and he is suggesting mementine or Namenda for her cognitive decline.  He reassured them that this does not typically have as much diarrhea as the other medications used for cognitive decline because it is in a totally different class.  They are going to try it " and they are also starting that very slowly and doing it just 1 a day for about 10 days before the increase to twice a day.    I reviewed that when she was at the emergency room for her chest pain that the thyroid that they completed at that time was normal so we will keep her on the same dose that she is currently on for now.    They also had done an extensive work-up for the chest pain she was having and ruled out all catastrophic causes for the chest pain.  She had just had a pacemaker placed because she had sick sinus syndrome and heart block and she is feeling much better since she got the pacemaker placed.    Overall they just wanted to review what has been going on and how she is been feeling and they are comfortable with the plan they have in place at this point.    Assessment/Plan:  1. Mild neurocognitive disorder  Saw neurology    2. Cardiac pacemaker in situ  Doing well    3. Sensorineural hearing loss (SNHL) of left ear with restricted hearing of right ear       4. Osteopenia       5. Sick sinus syndrome (H)  pacemaker    6. Sinus pause       7. Heart block       8. Hypothyroidism, unspecified type       9. Essential Hypercholesterolemia           Phone call duration:  12 minutes    Niurka Austin MD

## 2021-06-09 NOTE — PROGRESS NOTES
I have been receiving multiple MyChart messages from the patient's .  She had a pacemaker placed and then started to have some intermittent chest pain.  When she talked to the cardiologist they did not think it was serious and told her to call them if it got worse.    Her  then sent me a message that it was getting worse and it was happening every day and he could not reproduce the pain by pressing along her sternum so by my chart message, I told him to call the cardiologist office.  He talked to the nurse cardiologist office and they were supposed to get a call back from the cardiologist but never happened.    He sent me another MyChart message this morning and I told him that because of the chest pain and shortness of breath in light of the fact that she just been in the hospital, I wanted her to go to the urgency room in [facility] because they were the COVID positive location.    The patient and her  showed up here today and someone placed him on my schedule.    This was not the appropriate place for them to be evaluated so they were evaluated by the RN supervisor and sent to the emergency room.  She documented that her pulmonary condition was stable and reassured the patient with her anxiety and sent them to the emergency room.  Patient was not seen by me.

## 2021-06-09 NOTE — TELEPHONE ENCOUNTER
Refill Approved    Rx renewed per Medication Renewal Policy. Medication was last renewed on 08/21/2019.  Last office visit was 06/08/2020 with PCP.    Ileana Haider, [facility] Triage/Med Refill 6/21/2020     Requested Prescriptions   Pending Prescriptions Disp Refills     levothyroxine (SYNTHROID, LEVOTHROID) 50 MCG tablet [Pharmacy Med Name: Levothyroxine Sodium 50 MCG Oral Tablet] 90 tablet 0     Sig: Take 1 tablet by mouth once daily       Thyroid Hormones Protocol Passed - 6/20/2020  4:53 PM        Passed - Provider visit in past 12 months or next 3 months     Last office visit with prescriber/PCP: 9/30/2019 Niurka Austin MD OR same dept: Visit date not found OR same specialty: 5/20/2020 Lucy Benavides MD  Last physical: 8/21/2019 Last MTM visit: Visit date not found   Next visit within 3 mo: Visit date not found  Next physical within 3 mo: Visit date not found  Prescriber OR PCP: Niurka Austin MD  Last diagnosis associated with med order: 1. Elevated TSH  - levothyroxine (SYNTHROID, LEVOTHROID) 50 MCG tablet [Pharmacy Med Name: Levothyroxine Sodium 50 MCG Oral Tablet]; Take 1 tablet by mouth once daily  Dispense: 90 tablet; Refill: 0    If protocol passes may refill for 12 months if within 3 months of last provider visit (or a total of 15 months).             Passed - TSH on file in past 12 months for patient age 12 & older     TSH   Date Value Ref Range Status   05/30/2020 4.99 0.30 - 5.00 uIU/mL Final

## 2021-06-09 NOTE — TELEPHONE ENCOUNTER
Dr. Forrester called pt and  yesterday 7/1/20. Please see documentation note for further recommendations.    Courtney

## 2021-06-10 ENCOUNTER — RECORDS - HEALTHEAST (OUTPATIENT)
Dept: CARDIOLOGY | Facility: CLINIC | Age: 76
End: 2021-06-10

## 2021-06-10 NOTE — PROGRESS NOTES
Assessment/Plan:      Visit for Preoperative Exam.     Patient approved for surgery with general or local anesthesia. Above recommendations were reviewed with the patient. Copy of the pre-op was given to the patient to bring along on the day of surgery. Follow up as needed. Low Risk Surgery.     Subjective:     Scheduled Procedure: right hand surgery  Surgery Date:  5/24/17  Surgery Location:  [facility]  Surgeon:  Dr Chaudhary    Right hand/thumb pain, not responding to therapy, has used splints, progressive loss of motion and chronic daily pain.    Current Outpatient Prescriptions   Medication Sig Dispense Refill     aspirin 81 MG EC tablet Take 81 mg by mouth daily.       calcium-vitamin D (CALCIUM-VITAMIN D) 500 mg(1,250mg) -200 unit per tablet Take 1 tablet by mouth 2 (two) times a day with meals.       estradiol (ESTRACE) 1 MG tablet Take 1 tablet (1 mg total) by mouth 2 (two) times a day. 180 tablet 3     GLUCOSAMINE HCL ORAL Take 1 tablet by mouth daily.       lactobacillus rhamnosus GG (CULTURELLE) 10-15 Billion cell capsule Take 1 capsule by mouth daily.       levothyroxine (SYNTHROID) 50 MCG tablet Take 1 tablet (50 mcg total) by mouth daily. 90 tablet 3     multivitamin (MULTIVITAMIN) per tablet Take 1 tablet by mouth daily.       OMEGA-3/DHA/EPA/FISH OIL (FISH OIL-OMEGA-3 FATTY ACIDS) 300-1,000 mg capsule Take 1 g by mouth daily.       progesterone (PROMETRIUM) 100 MG capsule Take 1 capsule (100 mg total) by mouth bedtime. 90 capsule 3     SUMAtriptan (IMITREX) 25 MG tablet Take 1 tablet (25 mg total) by mouth every 2 (two) hours as needed for migraine. 10 tablet 5     No current facility-administered medications for this visit.        No Known Allergies    Immunization History   Administered Date(s) Administered     DT (pediatric) 07/11/2000     Hep A, historic 09/03/2007, 10/08/2008     Influenza high dose, seasonal 10/06/2015, 08/22/2016     Influenza, Seasonal, Inj PF 10/11/2012      Influenza, inj, historic 10/08/2007, 10/08/2008     Influenza,seasonal quad, PF 10/04/2013, 10/09/2014     Pneumo Conj 13-V (2010&after) 08/19/2015     Pneumo Polysac 23-V 08/05/2009     Td, historic 08/05/2009     Tdap 08/05/2009     ZOSTER 08/24/2009       Patient Active Problem List   Diagnosis     Essential Hypercholesterolemia     Osteopenia     Migraine Headache     Menopausal Disorder     Imaging Studies Nonspecific Abnormal Findings Breast     Elevated TSH       History reviewed. No pertinent past medical history.    Social History     Social History     Marital status:      Spouse name: N/A     Number of children: 2     Years of education: N/A     Occupational History     retired      Social History Main Topics     Smoking status: Never Smoker     Smokeless tobacco: Not on file     Alcohol use No     Drug use: No     Sexual activity: Yes     Partners: Male     Birth control/ protection: Post-menopausal     Other Topics Concern     Not on file     Social History Narrative       Past Surgical History:   Procedure Laterality Date     BREAST BIOPSY       HYSTERECTOMY  1970's     IL APPENDECTOMY      Description: Appendectomy;  Recorded: 10/08/2008;     IL BREAST SURGERY PROCEDURE UNLISTED      Description: Breast Surgery;  Recorded: 10/08/2008;     IL REMOVAL OF TONSILS,<13 Y/O      Description: Tonsillectomy;  Recorded: 10/08/2008;     IL REV UPPER EYELID W EXCESS SKIN      Description: Blepharoplasty Upper Lid W/ Excessive Skin;  Recorded: 08/02/2013;     IL VAGINAL HYSTERECTOMY,UTERUS 250 GMS/<      Description: Vaginal Hysterectomy;  Recorded: 08/19/2013;  Comments: prolapse.....has ovaries     IL VAGINAL HYSTERECTOMY,UTERUS 250 GMS/<      Description: Vaginal Hysterectomy;  Recorded: 08/15/2014;  Comments: prolapse.....has ovaries       History of Present Illness  Recent Health  Fever: no  Chills: no  Fatigue: no  Chest Pain: no  Cough: no  Dyspnea: no  Urinary Frequency: no  Nausea: no  Vomiting:  "no  Diarrhea: no  Abdominal Pain: no  Easy Bruising: no  Lower Extremity Swelling: no  Poor Exercise Tolerance: no    Most recent Health Maintenance Visit:  7 month(s) ago    Pertinent History  Prior Anesthesia: yes  Previous Anesthesia Reaction:  no  Diabetes: no  Cardiovascular Disease: no  Pulmonary Disease: no  Renal Disease: no  GI Disease: no  Sleep Apnea: yes, not on a Cpap anymore  Thromboembolic Problems: no  Clotting Disorder: no  Bleeding Disorder: no  Transfusion Reaction: no  Impaired Immunity: no  Steroid use in the last 6 months: no  Frequent Aspirin use: yes    Family history of MI- father    Social history of there is no transfusion refusal    After surgery, the patient plans to recover at home with family.    Review of Systems  A 12 point comprehensive review of systems was negative except as noted.          Objective:         Vitals:    05/12/17 1022   BP: 124/72   Pulse: 72   Weight: 114 lb 14.4 oz (52.1 kg)   Height: 5' 2\" (1.575 m)       Physical Exam:  Physical Exam:  General Appearance: Alert, cooperative, no distress, appears stated age  Head: Normocephalic, without obvious abnormality, atraumatic  Eyes: PERRL, conjunctiva/corneas clear, EOM's intact  Ears: Normal TM's and external ear canals, both ears  Nose: Nares normal, septum midline,mucosa normal, no drainage  Throat: Lips, mucosa, and tongue normal; teeth and gums normal  Neck: Supple, symmetrical, trachea midline, no adenopathy;  thyroid: not enlarged, symmetric, no tenderness/mass/nodules; no carotid bruit or JVD  Back: Symmetric, no curvature, ROM normal, no CVA tenderness  Lungs: Clear to auscultation bilaterally, respirations unlabored  Breasts: No breast masses, tenderness, asymmetry, or nipple discharge.  Heart: Regular rate and rhythm, S1 and S2 normal, no murmur, rub, or gallop, Abdomen: Soft, non-tender, bowel sounds active all four quadrants,  no masses, no organomegaly  Pelvic:Not examined  Extremities: right thumb " contracture; not full extension  Skin: Skin color, texture, turgor normal, no rashes or lesions  Lymph nodes: Cervical, supraclavicular, and axillary nodes normal  Neurologic: Normal

## 2021-06-11 ENCOUNTER — RECORDS - HEALTHEAST (OUTPATIENT)
Dept: FAMILY MEDICINE | Facility: CLINIC | Age: 76
End: 2021-06-11

## 2021-06-12 NOTE — PROGRESS NOTES
ASSESSMENT:  1. Menopausal and postmenopausal disorder     - estradiol (ESTRACE) 1 MG tablet; Take 1 tablet (1 mg total) by mouth 2 (two) times a day.  Dispense: 180 tablet; Refill: 3  - progesterone (PROMETRIUM) 100 MG capsule; Take 1 capsule (100 mg total) by mouth at bedtime.  Dispense: 90 capsule; Refill: 3    2. Migraine headache  Doesn't get many anymore  - SUMAtriptan (IMITREX) 25 MG tablet; Take 1 tablet (25 mg total) by mouth every 2 (two) hours as needed for migraine.  Dispense: 10 tablet; Refill: 5    3. Elevated TSH     - Thyroid Stimulating Hormone (TSH)    4. Routine general medical examination at a health care facility  All vaccines and HCM are up to date    5. Essential Hypercholesterolemia  She would like to start a statin even though she has a calcium score of 0  - Comprehensive Metabolic Panel  - Lipid Cascade  - Lipid Cascade; Future  - Comprehensive Metabolic Panel; Future         PLAN:  We will start atorvastatin and  in about a month, will do  a metabolic panel and fasting lipids so she can see how much benefit she gets from that medication.  We will also get their flu shots at that time before [place] go [place]    Orders Placed This Encounter   Procedures     Influenza High Dose, Seasonal 65+ yrs     Standing Status:   Future     Standing Expiration Date:   8/23/2018     Comprehensive Metabolic Panel     Lipid Cascade     Order Specific Question:   Fasting is required?     Answer:   Yes     Thyroid Stimulating Hormone (TSH)     Lipid Cascade     Standing Status:   Future     Standing Expiration Date:   8/23/2018     Order Specific Question:   Fasting is required?     Answer:   Yes     Comprehensive Metabolic Panel     Standing Status:   Future     Standing Expiration Date:   8/23/2018     Medications Discontinued During This Encounter   Medication Reason     estradiol (ESTRACE) 1 MG tablet Reorder     levothyroxine (SYNTHROID) 50 MCG tablet Reorder     progesterone (PROMETRIUM) 100  MG capsule Reorder     SUMAtriptan (IMITREX) 25 MG tablet Reorder       No Follow-up on file.    Health Maintenance Due   Topic Date Due     INFLUENZA VACCINE RULE BASED (1) 08/01/2017       CHIEF COMPLAINT:  Chief Complaint   Patient presents with     Annual Exam     fasting labs, questions about taking statins     foot pain     bottom of right foot, below the toes x 10 days       HISTORY OF PRESENT ILLNESS:  Roselyn Garcia is a 71 y.o. female presenting to the clinic today for a physical exam.     She has elevated cholesterol and elevated LDL.  We did a calcium score in 2013 but now she is interested in going on medication despite that to try to protect the vasculature in her brain and try to avoid stroke.  I discussed putting her on atorvastatin and then rechecking labs in about a month when they are here for their flu shots, before they go south to [place] for the winter.    She still recovering from hand surgery in her right thumb and that is taking a long time but she is getting hand therapy    She has some tenderness at the bottom of her right foot at the base of the metatarsals.  There is one point tender area that is discretely tender that she will try Dr. Moralez's wart pad on or a corn pad on and then if it is still bothering her when she gets to [place] she will see the podiatrist to assess whether or not this is a Portillo's neuroma.    Healthy Habits:   Patient reports regular exercise, dental and eye exams. Uses healthy diet. Always uses seatbelts. Reports uses medications as directed.  Alcohol: none  Smoking: none  Caffeine use: 1 cup per day- coffee  Drug use: none  Birth control: post-menopausal    REVIEW OF SYSTEMS:   All other systems are negative.    Immunization History   Administered Date(s) Administered     DT (pediatric) 07/11/2000     Hep A, historic 09/03/2007, 10/08/2008     Influenza high dose, seasonal 10/06/2015, 08/22/2016     Influenza, Seasonal, Inj PF 10/11/2012     Influenza, inj,  historic 10/08/2007, 10/08/2008     Influenza,seasonal quad, PF 10/04/2013, 10/09/2014     PPD Test 10/01/2001     Pneumo Conj 13-V (2010&after) 2015     Pneumo Polysac 23-V 2009     Rubella 2000     Td, historic 2009     Tdap 2009     ZOSTER 2009       GYNECOLOGIC HISTORY:  Last menstrual period: menopause  Contraception: post-menopausal  Last Pap: n/a Results were: n/a  Last mammogram: 16  Results were: normal    OB History      Para Term  AB Living    2 2 2       SAB TAB Ectopic Multiple Live Births        2        Obstetric Comments    Vaginal births          PFSH:     History   Smoking Status     Never Smoker   Smokeless Tobacco     Never Used     History reviewed. No pertinent family history.  Social History     Social History     Marital status:      Spouse name: Adolfo     Number of children: 2     Years of education: N/A     Occupational History     retired      Social History Main Topics     Smoking status: Never Smoker     Smokeless tobacco: Never Used     Alcohol use No     Drug use: No     Sexual activity: Yes     Partners: Male     Birth control/ protection: Post-menopausal     Other Topics Concern     None     Social History Narrative    [employer]     Past Surgical History:   Procedure Laterality Date     BREAST BIOPSY       HYSTERECTOMY  's     [place] APPENDECTOMY      Description: Appendectomy;  Recorded: 10/08/2008;     [place] BREAST SURGERY PROCEDURE UNLISTED      Description: Breast Surgery;  Recorded: 10/08/2008;     [place] REMOVAL OF TONSILS,<13 Y/O      Description: Tonsillectomy;  Recorded: 10/08/2008;     [place] REV UPPER EYELID W EXCESS SKIN      Description: Blepharoplasty Upper Lid W/ Excessive Skin;  Recorded: 2013;     [place] VAGINAL HYSTERECTOMY,UTERUS 250 GMS/<      Description: Vaginal Hysterectomy;  Recorded: 2013;  Comments: prolapse.....has ovaries     [place] VAGINAL HYSTERECTOMY,UTERUS 250 GMS/<      Description: Vaginal  "Hysterectomy;  Recorded: 08/15/2014;  Comments: prolapse.....has ovaries     SACRAL NERVE STIMULATOR PLACEMENT  07/31/2013    Dr Campo     No Known Allergies  Active Ambulatory Problems     Diagnosis Date Noted     Essential Hypercholesterolemia      Osteopenia      Migraine Headache      Menopausal Disorder      Imaging Studies Nonspecific Abnormal Findings Breast      Elevated TSH 08/24/2016     Resolved Ambulatory Problems     Diagnosis Date Noted     Adult Sleep Apnea      No Additional Past Medical History       VITALS:  Vitals:    08/23/17 1030   BP: (!) 138/92   Patient Site: Right Arm   Patient Position: Sitting   Cuff Size: Adult Regular   Pulse: 68   Weight: 114 lb 4.8 oz (51.8 kg)   Height: 5' 1.5\" (1.562 m)     BP Readings from Last 3 Encounters:   08/23/17 (!) 138/92   05/12/17 124/72   12/22/16 146/78     Wt Readings from Last 3 Encounters:   08/23/17 114 lb 4.8 oz (51.8 kg)   05/12/17 114 lb 14.4 oz (52.1 kg)   12/22/16 116 lb 9.6 oz (52.9 kg)     Body mass index is 21.25 kg/(m^2).    PHYSICAL EXAM:  General Appearance: Alert, cooperative, no distress, appears stated age  Head: Normocephalic, without obvious abnormality, atraumatic  Eyes: PERRL, conjunctiva/corneas clear, EOM's intact  Ears: Normal TM's and external ear canals, both ears  Nose: Nares normal, septum midline,mucosa normal, no drainage  Throat: Lips, mucosa, and tongue normal; teeth and gums normal  Neck: Supple, symmetrical, trachea midline, no adenopathy;  thyroid: not enlarged, symmetric, no tenderness/mass/nodules; no carotid bruit or JVD  Back: Symmetric, no curvature, ROM normal, no CVA tenderness  Lungs: Clear to auscultation bilaterally, respirations unlabored  Breasts: No breast masses, tenderness, asymmetry, or nipple discharge.  Heart: Regular rate and rhythm, S1 and S2 normal, no murmur, rub, or gallop, Abdomen: Soft, non-tender, bowel sounds active all four quadrants,  no masses, no organomegaly  Pelvic:Not " examined  Extremities: Extremities normal, atraumatic, no cyanosis or edema  Skin: Skin color, texture, turgor normal, no rashes or lesions  Lymph nodes: Cervical, supraclavicular, and axillary nodes normal  Neurologic: Normal       QUALITY MEASURES:  I have had an Advance Directives discussion with the patient.     MEDICATIONS:  Current Outpatient Prescriptions   Medication Sig Dispense Refill     aspirin 81 MG EC tablet Take 81 mg by mouth daily.       calcium-vitamin D (CALCIUM-VITAMIN D) 500 mg(1,250mg) -200 unit per tablet Take 1 tablet by mouth 2 (two) times a day with meals.       estradiol (ESTRACE) 1 MG tablet Take 1 tablet (1 mg total) by mouth 2 (two) times a day. 180 tablet 3     GLUCOSAMINE HCL ORAL Take 1 tablet by mouth daily.       lactobacillus rhamnosus GG (CULTURELLE) 10-15 Billion cell capsule Take 1 capsule by mouth daily.       levothyroxine (SYNTHROID) 50 MCG tablet Take 1 tablet (50 mcg total) by mouth daily. 90 tablet 3     multivitamin (MULTIVITAMIN) per tablet Take 1 tablet by mouth daily.       OMEGA-3/DHA/EPA/FISH OIL (FISH OIL-OMEGA-3 FATTY ACIDS) 300-1,000 mg capsule Take 1 g by mouth daily.       progesterone (PROMETRIUM) 100 MG capsule Take 1 capsule (100 mg total) by mouth at bedtime. 90 capsule 3     SUMAtriptan (IMITREX) 25 MG tablet Take 1 tablet (25 mg total) by mouth every 2 (two) hours as needed for migraine. 10 tablet 5     atorvastatin (LIPITOR) 10 MG tablet Take 1 tablet (10 mg total) by mouth daily. 30 tablet 11     No current facility-administered medications for this visit.

## 2021-06-12 NOTE — PROGRESS NOTES
Assessment and Plan:   Annual Wellness  Patient has been advised of split billing requirements and indicates understanding: n/a  1. Encounter for general adult medical examination with abnormal findings  Already had mammogram  Is not going to do bone density    2. Essential Hypercholesterolemia     - Lipid Cascade  - pravastatin (PRAVACHOL) 20 MG tablet; Take 1 tablet (20 mg total) by mouth every evening.  Dispense: 90 tablet; Refill: 3    3. Migraine without status migrainosus, not intractable, unspecified migraine type       4. Hypothyroidism, unspecified type     - T4, Free  - Thyroid Stimulating Hormone (TSH)  - T3 (Triiodothyronine), Free    5. Heart block  Has pacemaker  - Comprehensive Metabolic Panel    6. Sick sinus syndrome (H)  pacemaker    7. Sensorineural hearing loss (SNHL) of left ear with restricted hearing of right ear  Has hearing aids    8. Menopausal Disorder  We again had a discussion about risks and benefits of continuing on Estrace and Prometrium.  She still wants to continue those knowing that she has an increased risk of blood clots and breast and uterine cancer.  Because it might be protecting her cognitive function, they wanted to continue for another year.    - citalopram (CELEXA) 10 MG tablet; TAKE 1 TABLET BY MOUTH DAILY AS DIRECTED. GENERIC EQUIVALENT FOR CELEXA  Dispense: 90 tablet; Refill: 3  - estradioL (ESTRACE) 1 MG tablet; Take 1 tablet (1 mg total) by mouth daily.  Dispense: 90 tablet; Refill: 3  - progesterone (PROMETRIUM) 100 MG capsule; Take 1 capsule (100 mg total) by mouth at bedtime.  Dispense: 90 capsule; Refill: 3    9. Mild neurocognitive disorder  She has seen neurologist a couple of times and all the medications that have been tried for her cognitive dysfunction cause untoward side effects so they are not doing any medication for that at this time    10. Cardiac pacemaker in situ       11. Encounter for administration of vaccine     - Influenza,Quad,High Dose,PF 65  YR+    12. Anxiety  She is stable on this dose and they do not want to change anything  - citalopram (CELEXA) 10 MG tablet; TAKE 1 TABLET BY MOUTH DAILY AS DIRECTED. GENERIC EQUIVALENT FOR CELEXA  Dispense: 90 tablet; Refill: 3    13. Menopausal and postmenopausal disorder     - citalopram (CELEXA) 10 MG tablet; TAKE 1 TABLET BY MOUTH DAILY AS DIRECTED. GENERIC EQUIVALENT FOR CELEXA  Dispense: 90 tablet; Refill: 3  - estradioL (ESTRACE) 1 MG tablet; Take 1 tablet (1 mg total) by mouth daily.  Dispense: 90 tablet; Refill: 3  - progesterone (PROMETRIUM) 100 MG capsule; Take 1 capsule (100 mg total) by mouth at bedtime.  Dispense: 90 capsule; Refill: 3    14. Migraine headache     - SUMAtriptan (IMITREX) 25 MG tablet; Take 1 tablet (25 mg total) by mouth every 2 (two) hours as needed for migraine.  Dispense: 10 tablet; Refill: 5        The patient's current medical problems were reviewed.    I have had an Advance Directives discussion with the patient.  The following are part of a depression follow up plan for the patient:  coping support assessment, emotional support assessment and mental health care assessment  The following health maintenance schedule was reviewed with the patient and provided in printed form in the after visit summary:   Health Maintenance   Topic Date Due     DEPRESSION ACTION PLAN  1945     Pneumococcal Vaccine: 65+ Years (2 of 2 - PPSV23) 08/19/2016     DXA SCAN  09/14/2018     INFLUENZA VACCINE RULE BASED (1) 08/01/2020     ZOSTER VACCINES (3 of 3) 10/16/2019     MEDICARE ANNUAL WELLNESS VISIT  10/12/2021     FALL RISK ASSESSMENT  10/12/2021     ADVANCE CARE PLANNING  08/16/2023     LIPID  09/30/2024     TD 18+ HE  08/21/2029     Pneumococcal Vaccine: Pediatrics (0 to 5 Years) and At-Risk Patients (6 to 64 Years)  Aged Out     HEPATITIS B VACCINES  Aged Out        Subjective:   Chief Complaint: Roselyn Garcia is an 75 y.o. female here for an Annual Wellness visit.   HPI: She is here with  her  today.  She had an inordinately difficult time doing her mini cog testing.  She was not able to remember any of the 3 words and she was not able to draw a clock face showing 11:10.  She ortiz a clock, some arrows, and smiley faces between each of the lines.  She then got a little weepy when she realized that she did not really understand the directions.    We had a long conversation about her hormone replacement, but since she likely would have some symptoms if she went off her estrogen progesterone, they were not inclined to stop that at this time even though they know there is an increased risk of blood clots and potentially cancers.    She is not on any medication for her cognitive dysfunction now because she had such bad side effects from everything that was tried.    They will not be going to [place] this winter.  Her  is reluctant to leave her alone so even having him decide to try to go to a support group is difficult and it would be painful to her because she would know what he is talking about and that makes her sad to realize that she is failing mentally.    Review of Systems: She has implantable stimulation for her bladder so that is stable.  She uses Metamucil every day for her bowels so that is stable.  She has hearing aids for her decreased hearing.  She has been to the eye doctor and had both cataracts removed and I believe has to have some other treatment for some film on the lens but she otherwise does not have anything else to complain about.  She did have a pacemaker placed because she was having syncope from heart block.  Please see above.  The rest of the review of systems are negative for all systems.    Patient Care Team:  Niurka Austin MD as PCP - General  Niurka Austin MD as Assigned PCP     Patient Active Problem List   Diagnosis     Essential Hypercholesterolemia     Osteopenia     Migraine Headache     Menopausal Disorder     Imaging Studies Nonspecific Abnormal  Findings Breast     Mild neurocognitive disorder     Sensorineural hearing loss (SNHL) of left ear with restricted hearing of right ear     Syncope, unspecified syncope type     Hypothyroidism, unspecified type     Heart block     Sick sinus syndrome (H)     Sinus pause     Cardiac pacemaker in situ     No past medical history on file.   Past Surgical History:   Procedure Laterality Date     BREAST EXCISIONAL BIOPSY Right     While ago     EP PACEMAKER INSERT Left 5/31/2020    Procedure: EP Pacemaker Insertion;  Surgeon: Mo Forrester MD;  Location: [facility];  Service: Cardiology     HYSTERECTOMY  1972     [place] APPENDECTOMY      Description: Appendectomy;  Recorded: 10/08/2008;     [place] BREAST SURGERY PROCEDURE UNLISTED      Description: Breast Surgery;  Recorded: 10/08/2008;     [place] REMOVAL OF TONSILS,<13 Y/O      Description: Tonsillectomy;  Recorded: 10/08/2008;     [place] REV UPPER EYELID W EXCESS SKIN      Description: Blepharoplasty Upper Lid W/ Excessive Skin;  Recorded: 08/02/2013;     [place] VAGINAL HYSTERECTOMY,UTERUS 250 GMS/<      Description: Vaginal Hysterectomy;  Recorded: 08/19/2013;  Comments: prolapse.....has ovaries     [place] VAGINAL HYSTERECTOMY,UTERUS 250 GMS/<      Description: Vaginal Hysterectomy;  Recorded: 08/15/2014;  Comments: prolapse.....has ovaries     SACRAL NERVE STIMULATOR PLACEMENT  07/31/2013    Dr Campo      No family history on file.   Social History     Socioeconomic History     Marital status:      Spouse name: Adolfo     Number of children: 2     Years of education: Not on file     Highest education level: Not on file   Occupational History     Occupation: retired   Social Needs     Financial resource strain: Not on file     Food insecurity     Worry: Not on file     Inability: Not on file     Transportation needs     Medical: Not on file     Non-medical: Not on file   Tobacco Use     Smoking status: Never Smoker     Smokeless tobacco: Never Used   Substance and Sexual  Activity     Alcohol use: No     Drug use: No     Sexual activity: Yes     Partners: Male     Birth control/protection: Post-menopausal   Lifestyle     Physical activity     Days per week: Not on file     Minutes per session: Not on file     Stress: Not on file   Relationships     Social connections     Talks on phone: Not on file     Gets together: Not on file     Attends Nondenominational service: Not on file     Active member of club or organization: Not on file     Attends meetings of clubs or organizations: Not on file     Relationship status: Not on file     Intimate partner violence     Fear of current or ex partner: Not on file     Emotionally abused: Not on file     Physically abused: Not on file     Forced sexual activity: Not on file   Other Topics Concern     Not on file   Social History Narrative    [employer]      Current Outpatient Medications   Medication Sig Dispense Refill     aspirin 81 MG EC tablet Take 81 mg by mouth 3 (three) times a week. Monday Wednesday Friday       calcium-vitamin D (CALCIUM-VITAMIN D) 500 mg(1,250mg) -200 unit per tablet Take 1 tablet by mouth 2 (two) times a day with meals.       citalopram (CELEXA) 10 MG tablet TAKE 1 TABLET BY MOUTH DAILY AS DIRECTED. GENERIC EQUIVALENT FOR CELEXA 90 tablet 1     cyanocobalamin 1000 MCG tablet Take 1,000 mcg by mouth daily.       estradioL (ESTRACE) 1 MG tablet Take 1 mg by mouth daily.       lactobacillus rhamnosus GG (CULTURELLE) 10-15 Billion cell capsule Take 1 capsule by mouth 2 (two) times a day.        levothyroxine (SYNTHROID, LEVOTHROID) 50 MCG tablet Take 1 tablet by mouth once daily 90 tablet 3     pravastatin (PRAVACHOL) 20 MG tablet Take 1 tablet (20 mg total) by mouth every evening. 90 tablet 3     progesterone (PROMETRIUM) 100 MG capsule Take 1 capsule (100 mg total) by mouth at bedtime. 90 capsule 3     pyridoxine, vitamin B6, (VITAMIN B-6) 100 MG tablet Take 200 mg by mouth daily.       SUMAtriptan (IMITREX) 25 MG tablet Take 1  "tablet (25 mg total) by mouth every 2 (two) hours as needed for migraine. 10 tablet 5     No current facility-administered medications for this visit.       Objective:   Vital Signs:   Visit Vitals  /68 (Patient Site: Right Arm, Patient Position: Sitting, Cuff Size: Adult Regular)   Pulse 76   Ht 5' 1.5\" (1.562 m)   Wt 122 lb 6.4 oz (55.5 kg)   LMP 07/29/1972   BMI 22.75 kg/m           VisionScreening:  No exam data present is recently seen her ophthalmologist.    PHYSICAL EXAM  General Appearance: Alert, cooperative, no distress, appears stated age  Head: Normocephalic, without obvious abnormality, atraumatic  Eyes: PERRL, conjunctiva/corneas clear, EOM's intact  Ears: Normal TM's and external ear canals, both ears  Nose: Nares normal, septum midline,mucosa normal, no drainage  Throat: Lips, mucosa, and tongue normal; teeth and gums normal  Neck: Supple, symmetrical, trachea midline, no adenopathy;  thyroid: not enlarged, symmetric, no tenderness/mass/nodules   Back: Symmetric, no curvature, ROM normal, no CVA tenderness  Lungs: Clear to auscultation bilaterally, respirations unlabored  Breasts:  Patient deferred  Heart: Regular rate and rhythm, S1 and S2 normal, no murmur, rub, or gallop, Abdomen: Soft, non-tender, bowel sounds active all four quadrants,  no masses, no organomegaly  Pelvic:Not examined  Extremities: Extremities normal, atraumatic, no cyanosis or edema  Skin: Skin color, texture, turgor normal, no rashes or lesions  Lymph nodes: Cervical, supraclavicular, and axillary nodes normal  Neurologic: Normal     Assessment Results 10/12/2020   Activities of Daily Living No help needed   Instrumental Activities of Daily Living 5-6 - Severe functional impairment   Get Up and Go Score Less than 12 seconds   Mini Cog Total Score 0   Some recent data might be hidden     A Mini-Cog score of 0-2 suggests the possibility of dementia, score of 3-5 suggests no dementia  No falls risk  Cognitive Screen not " completed due to known dementia.  It was attempted but she did not successfully complete the cognitive screening and knowing that she fails it makes her very sad    Identified Health Risks:     The patient was provided with suggestions to help her develop a healthy physical lifestyle.   She is at risk for lack of exercise and has been provided with information to increase physical activity for the benefit of her well-being.  The patient reports that she has difficulty with instrumental activities of daily living.  Her  is with her all day every day to help with these issues   She has bilateral hearing aids to assist with her hearing loss  She already has been treated for her urinary incontinence with electrical implanted stimulator  The patient was provided with suggestions to help her develop a healthy emotional lifestyle.  She is on medication and has her  available.  The patient's PHQ-9 score is consistent with mild depression.  They do not want to change her medication that she is using for her depression and anxiety.  A lot of her depression is from her cognitive decline and her awareness of that   patient's advanced directive was discussed and I am comfortable with the patient's wishes.  He has already been seen by couple of neurologists for her cognitive decline and none of the medications they have tried have been helpful.

## 2021-06-13 NOTE — PROGRESS NOTES
Assessment:   1. Non-recurrent acute suppurative otitis media of left ear without spontaneous rupture of tympanic membrane  Discussed diagnosis and treatment plan with patient and her  and answered all their questions. Patient will follow up for a recheck in 10 days.   - amoxicillin (AMOXIL) 875 MG tablet; Take 1 tablet (875 mg total) by mouth 2 (two) times a day for 10 days.  Dispense: 20 tablet; Refill: 0    Plan:   Treatment: Amoxicillin.  OTC analgesia as needed.  Fluids, rest, avoid carbonated/alcoholic and caffeinated beverages.   Follow up in 10 days if not improving.     Subjective:   Roselyn Garcia is a 75 y.o. female who presents with ear pain and possible ear infection. Symptoms include: left ear pain, plugged sensation in the left ear and swollen glands. Onset of symptoms was 2 days ago, and have been gradually worsening since that time. Associated symptoms include: facial pain and headache.  Patient denies: low grade fever, non productive cough, post nasal drip, productive cough, sneezing and sore throat. She is drinking moderate amounts of fluids.    The following portions of the patient's history were reviewed and updated as appropriate: allergies, current medications, past family history, past medical history, past social history, past surgical history and problem list.    Review of Systems  A 12 point comprehensive review of systems was negative except as noted.     Objective:   /67   Pulse 72   Temp 97.6  F (36.4  C)   Wt 128 lb (58.1 kg)   LMP 07/29/1972   SpO2 99%   BMI 23.79 kg/m    General:  alert, appears stated age and cooperative   Right Ear: normal landmarks and mobility   Left Ear: inflamed, tender with movement of pinna and erythematous   Mouth:  lips, mucosa, and tongue normal; teeth and gums normal   Neck: no adenopathy, no carotid bruit, no JVD, supple, symmetrical, trachea midline and thyroid not enlarged, symmetric, no tenderness/mass/nodules

## 2021-06-14 ENCOUNTER — RECORDS - HEALTHEAST (OUTPATIENT)
Dept: LAB | Facility: CLINIC | Age: 76
End: 2021-06-14

## 2021-06-15 LAB
ANION GAP SERPL CALCULATED.3IONS-SCNC: 11 MMOL/L (ref 5–18)
BUN SERPL-MCNC: 13 MG/DL (ref 8–28)
CALCIUM SERPL-MCNC: 9.1 MG/DL (ref 8.5–10.5)
CHLORIDE BLD-SCNC: 105 MMOL/L (ref 98–107)
CO2 SERPL-SCNC: 23 MMOL/L (ref 22–31)
CREAT SERPL-MCNC: 0.92 MG/DL (ref 0.6–1.1)
ERYTHROCYTE [DISTWIDTH] IN BLOOD BY AUTOMATED COUNT: 12.9 % (ref 11–14.5)
GFR SERPL CREATININE-BSD FRML MDRD: 59 ML/MIN/1.73M2
GLUCOSE BLD-MCNC: 87 MG/DL (ref 70–125)
HCT VFR BLD AUTO: 37.8 % (ref 35–47)
HGB BLD-MCNC: 12.3 G/DL (ref 12–16)
MCH RBC QN AUTO: 30.4 PG (ref 27–34)
MCHC RBC AUTO-ENTMCNC: 32.5 G/DL (ref 32–36)
MCV RBC AUTO: 93 FL (ref 80–100)
PLATELET # BLD AUTO: 315 THOU/UL (ref 140–440)
PMV BLD AUTO: 8.9 FL (ref 8.5–12.5)
POTASSIUM BLD-SCNC: 4.1 MMOL/L (ref 3.5–5)
RBC # BLD AUTO: 4.05 MILL/UL (ref 3.8–5.4)
SODIUM SERPL-SCNC: 139 MMOL/L (ref 136–145)
TSH SERPL DL<=0.005 MIU/L-ACNC: 4.42 UIU/ML (ref 0.3–5)
WBC: 6.5 THOU/UL (ref 4–11)

## 2021-06-15 NOTE — PROGRESS NOTES
Clinic Care Coordination Contact  Community Health Worker Initial Outreach    Patient accepts CC: No, not at this time. Patient and  would like to think about enrolling in CCC and call CHW back inf interested . Patient will be sent Care Coordination introduction letter for future reference.     ** CHW spoke with patients  Adolfo. Adolfo put phone on speaker for Michelle to be a part of the conversation. Adolfo asked for CHW information for him to call back after they think about enrolling in CCC.

## 2021-06-15 NOTE — PROGRESS NOTES
"Chief complaint: Anxiety    HPI: This patient comes in with her  because she has been feeling more anxious.  She has memory issues and dementia and despite having had neuropsychological testing and neurological consultation, she has not tolerated any of the medication that has been prescribed to try to mitigate the progression of her dementia.  She has significant side effects from the medication so she has not been taking that.    We had her on citalopram for her anxiety and it has been working fairly well but the last month has gotten significantly worse.    Her  has sheets of paper that he tries to write out answers to the questions that she keeps asking repetitively every day.  She is anxious and nervous and he has to \"talk her off the ledge\" and then later in the day she comes back with the same worries about her mother and her little sister.  Her little sister is over 72 years old and is  and is well-established.  Her mother has been dead for quite a while.    When she does not remember something then she gets confused then she becomes afraid then she becomes more anxious and its the same thing over and over and over again.    They are still living in their own home.  The  has not done anything about getting into an Alzheimer's support group.  The pandemic has made it difficult to have social interaction that is safe.  He cannot have anyone come in to watch her while he is gone.  He cannot be on a phone call or a Zoom call talking about dementia or Alzheimer's because she would hear him and then she would become very upset and distraught.    It does not sound like they have made any plans for the time that she is no longer safe to be at home.  Right now that is not an issue.  I do not know how to determine when that would become an issue.    We talked at length about potentially doing a very small increase in the citalopram from 10 mg to 15 mg a day but I do want him to go back to " the neurologist to see what options they have available for support groups, what options they have available for helping to determine when is time to move into an assisted living or memory care type situation.    Objective:/64 (Patient Site: Right Arm, Patient Position: Sitting, Cuff Size: Adult Regular)   Pulse 72   Wt 123 lb 12.8 oz (56.2 kg)   LMP 07/29/1972   BMI 23.01 kg/m    The patient cried today when asked why she was here and she did not know why she was at the office today.  Her  had to speak for her.  He got weepy as we talked about this progression as well.    Assessment: Anxiety medication management  Dementia progressing  Confusion    Plan: The only thing I am comfortable doing is a very small increase in her citalopram.  I do not know if she potentially needs a geriatric psychiatrist who deals with mental health issues from people who have dementia as it progresses.  She has not become violent but I am not sure if she is good to be able to tolerate a higher dose of citalopram to manage the anxiety either    Spent 30 minutes today all that time was in counseling and care coordination and discussion trying to get them into higher level of care with more expertise to help manage these issues.

## 2021-06-16 PROBLEM — Z95.0 CARDIAC PACEMAKER IN SITU: Status: ACTIVE | Noted: 2020-06-01

## 2021-06-16 PROBLEM — H90.A22 SENSORINEURAL HEARING LOSS (SNHL) OF LEFT EAR WITH RESTRICTED HEARING OF RIGHT EAR: Chronic | Status: ACTIVE | Noted: 2018-08-22

## 2021-06-16 PROBLEM — I49.5 SICK SINUS SYNDROME (H): Status: ACTIVE | Noted: 2020-05-31

## 2021-06-16 PROBLEM — G31.84 MILD NEUROCOGNITIVE DISORDER: Status: ACTIVE | Noted: 2018-07-11

## 2021-06-17 ENCOUNTER — RECORDS - HEALTHEAST (OUTPATIENT)
Dept: FAMILY MEDICINE | Facility: CLINIC | Age: 76
End: 2021-06-17

## 2021-06-17 DIAGNOSIS — Z12.31 VISIT FOR SCREENING MAMMOGRAM: ICD-10-CM

## 2021-06-17 NOTE — TELEPHONE ENCOUNTER
Risperidone 1 mg.  Qty per month:  60  Key:  [id number]    Med not covered.  Do you want to do a PA or send an alternative?    Please list all diagnoses associated with use of medication (to be eligible for coverage, drug must be prescribed for a medically accepted indication as defined by Medicare law).      Thanks,  Annette Gilliland, CMA

## 2021-06-17 NOTE — TELEPHONE ENCOUNTER
Telephone Encounter by Damaris Herman RN at 6/9/2020  7:51 AM     Author: Damaris Herman RN Service: -- Author Type: Registered Nurse    Filed: 6/9/2020  7:53 AM Encounter Date: 6/1/2020 Status: Signed    : Damaris Herman RN (Registered Nurse)       Mo Forrester MD Gebel, Mandy L, RN    Caller: Unspecified (1 week ago)               Phone call to the patient's  today confirms that the patient has had resolution of her discomfort.  She is walking daily without any problems.  She reports no shortness of breath to him.  Her stamina has returned to normal.  Routine follow-up through device clinic.      Above noted. Post-op appt 6/9/20  Damaris Herman RN

## 2021-06-17 NOTE — TELEPHONE ENCOUNTER
Who is calling:    Reason for Call:  pcp is out of office til next week.  Physical is schedule with Dr Peres for 04/30/2021 at 920 am.  Date of last appointment with primary care: NA  Okay to leave a detailed message: Yes

## 2021-06-17 NOTE — TELEPHONE ENCOUNTER
Patient had a physical 10/12/20. Any idea how I would schedule this? Would it just be a follow up appointment?

## 2021-06-17 NOTE — TELEPHONE ENCOUNTER
"4-27-21  Reason for Call: appointment     Detailed comments: Huang  called stated pt is moving into:  [facility] [place]   On Monday May 3rd and [facility] is requesting pt have a \"pre-move in physical\"  And a covid test on or after Friday April 30th   Please assist with scheduled    Phone Number Patient can be reached at: Cell number on file:    Telephone Information:   Mobile [phone]       Best Time: anytime    Can we leave a detailed message on this number?: Yes    Call taken on 4/27/2021 at 12:00 PM by Margoth Berumen    "

## 2021-06-17 NOTE — TELEPHONE ENCOUNTER
Seen in clinic yesterday 4/30 with Dr Boyd.   is looking for patient's COVID test results. He was told they would be back in 24 hrs, but they are not in patient's My Chart.  He states he has 72 hrs  to get the results to the person requiring results.   No symptoms. She has been vaccinated.   Answering service contacted: Dr Mendez on call, connected @ 7:16pm. Please have Dr Boyd address on Monday. Message forwarded to provider.     Katie Garcia RN Triage Nurse Advisor 7:26 PM 5/1/2021     Coronavirus (COVID-19) Notification    Lab Result   Lab test 2019-nCoV rRt-PCR OR SARS-COV-2 PCR    Nasopharyngeal AND/OR Oropharyngeal swab is NEGATIVE for 2019-nCoV RNA [OR] SARS-COV-2 RNA (COVID-19) RNA    Your result was negative. This means that we didn't find the virus that causes COVID-19 in your sample. A test may show negative when you do actually have the virus. This can happen when the virus is in the early stages of infection, before you feel illness symptoms.    If you have symptoms   Stay home and away from others (self-isolate) until you meet ALL of the guidelines below:    You've had no fever--and no medicine that reduces fever--for 1 full day (24 hours). And      Your other symptoms have gotten better. For example, your cough or breathing has improved. And     At least 10 days have passed since your symptoms started. (If you ve been told by a doctor that you have a weak immune system, wait 20 days.)     During this time:    Stay home. Don't go to work, school or anywhere else.     Stay in your own room, including for meals. Use your own bathroom if you can.    Stay away from others in your home. No hugging, kissing or shaking hands. No visitors.    Clean  high touch  surfaces often (doorknobs, counters, handles, etc.). Use a household cleaning spray or wipes. You can find a full list on the EPA website at www.epa.gov/pesticide-registration/list-n-disinfectants-use-against-sars-cov-2.    Cover  your mouth and nose with a mask, tissue or other face covering to avoid spreading germs.    Wash your hands and face often with soap and water.    Going back to work  Check with your employer for any guidelines to follow for going back to work.  You are sent a letter for your Employer which will serve as formal document notice that you, the employee, tested negative for COVID-19, as of the testing date shown above.    If your symptoms worsen or other concerning symptoms, contact PCP, oncare or consider returning to Emergency Dept.    Where can I get more information?    [facility]: [email or web address]    Coronavirus Basics: www.health.[facility].[place].us/diseases/coronavirus/basics.html    [facility] Hotline (474-816-5957)    Katie Garcia RN

## 2021-06-18 NOTE — PATIENT INSTRUCTIONS - HE
Patient Instructions by Viviana Espino FNP at 11/19/2020 11:20 AM     Author: Viviana Espino FNP Service: -- Author Type: Nurse Practitioner    Filed: 11/19/2020 11:48 AM Encounter Date: 11/19/2020 Status: Signed    : Viviana Espino FNP (Nurse Practitioner)       Patient Education     Middle Ear Infection (Adult)  You have an infection of the middle ear, the space behind the eardrum. This is also called acute otitis media (AOM). Sometimes it is caused by the common cold. This is because congestion can block the internal passage (eustachian tube) that drains fluid from the middle ear. When the middle ear fills with fluid, bacteria can grow there and cause an infection. Oral antibiotics are used to treat this illness, not ear drops. Symptoms usually start to improve within 1 to 2 days of treatment.    Home care  The following are general care guidelines:    Finish all of the antibiotic medicine given, even though you may feel better after the first few days.    You may use over-the-counter medicine, such as acetaminophen or ibuprofen, to control pain and fever, unless something else was prescribed. If you have chronic liver or kidney disease or have ever had a stomach ulcer or gastrointestinal bleeding, talk with your healthcare provider before using these medicines. Do not give aspirin to anyone under 18 years of age who has a fever. It may cause severe illness or death.  Follow-up care  Follow up with your healthcare provider, or as advised, in 2 weeks if all symptoms have not gotten better, or if hearing doesn't go back to normal within 1 month.  When to seek medical advice  Call your healthcare provider right away if any of these occur:    Ear pain gets worse or does not improve after 3 days of treatment    Unusual drowsiness or confusion    Neck pain, stiff neck, or headache    Fluid or blood draining from the ear canal    Fever of 100.4 F (38 C) or as advised     Seizure  Date Last Reviewed:  6/1/2016 2000-2017 [employer]. [address] [place] [address], [place] [address]. All rights reserved. This information is not intended as a substitute for professional medical care. Always follow your healthcare professional's instructions.

## 2021-06-19 NOTE — PROGRESS NOTES
NEUROPSYCHOLOGICAL CONSULTATION    NAME:  Roselyn Garcia  :  1945    [place]: 2018    REASON FOR REFERRAL:  Ms. Garcia is a 72 year-old, right-handed,  female with history of hypercholesterolemia, hearing loss, migraines, and osteopenia. The patient initially expressed concern about memory and word-finding difficulties at an appointment with her PCP, Niurka Austin MD, in 2016. While the patient initially declined a neuropsychological evaluation, she continued to experience cognitive decline over the years and ultimately requested an evaluation. She was referred for this neuropsychological assessment by Dr. Austin to establish a neurocognitive baseline and to assist with differential diagnosis and care planning.     Verbal consent for neuropsychological testing was received following the provision of information about the nature and purpose of the evaluation, and the opportunity to ask questions. Verbal permission to route a copy of the final report to her primary care provider was also obtained.     HISTORY OF PRESENTING PROBLEM:  The following information was obtained via medical record review and by interview of the patient and her , Adolfo.    Ms. Garcia reported experiencing cognitive problems that began insidiously about one year ago and has gradually worsened over time. Specifically, she has noticed short-term memory problems and word-finding difficulty. She relies on lists more heavily than ever and misplaces things around the house more often. The patient became quite tearful while discussing her cognitive difficulties, and she acknowledged being fearful that she has Alzhiemer's like her mother did. Her , Adolfo, added that she repeats conversations with little to no awareness, and sometimes performs tasks twice without realizing it (e.g., cleaning the sink). He reported that she recently told him that she is afraid to speak to him because he might notice her cognitive  problems. He has also observed that she has greater difficulty using their [employer] laptop, which the patient attributes to frequent software updates. They denied any concerns or changes with regard to attention/concentration, processing speed, or other language difficulties.    With regard to the activities of daily living, Ms. Garcia reported that she is fully independent. She and her  live together in their own town home. She continues to drive but usually goes everywhere with Adolfo and will ride passenger. She denied getting lost in familiar places or having any recent accidents or close calls. Adolfo reportedly has always set out her medications in the pillbox, and the patient denied needing reminders or forgetting to take her medications. Adolfo has managed the finances since he retired; they reported no issues with her financial management or spending habits. The patient continues to cook and prepare meals, and perform household chores and errands without issue.    MEDICAL HISTORY:  Ms. Renaes medical history is significant for hypercholesterolemia, osteopenia, migraines, elevated TSH, menopausal disorder, and hearing loss. She also reported unsteadiness when she arises from a seated position too quickly. She has a history of ELAINE (with CPAP) but this has resolved. She denied history of significant head injuries, seizure, stroke, heart attack, tremor, or recent falls.    Diagnostic studies:  None available.     Past Surgical History:   Procedure Laterality Date     BREAST BIOPSY       HYSTERECTOMY  1970's     AL APPENDECTOMY      Description: Appendectomy;  Recorded: 10/08/2008;     AL BREAST SURGERY PROCEDURE UNLISTED      Description: Breast Surgery;  Recorded: 10/08/2008;     AL REMOVAL OF TONSILS,<10 Y/O      Description: Tonsillectomy;  Recorded: 10/08/2008;     AL REV UPPER EYELID W EXCESS SKIN      Description: Blepharoplasty Upper Lid W/ Excessive Skin;  Recorded: 08/02/2013;     AL VAGINAL  HYSTERECTOMY,UTERUS 250 GMS/<      Description: Vaginal Hysterectomy;  Recorded: 2013;  Comments: prolapse.....has ovaries     [place] VAGINAL HYSTERECTOMY,UTERUS 250 GMS/<      Description: Vaginal Hysterectomy;  Recorded: 08/15/2014;  Comments: prolapse.....has ovaries     SACRAL NERVE STIMULATOR PLACEMENT  2013    Dr Campo     Current medications include (per medical record):   Current Outpatient Prescriptions:      aspirin 81 MG EC tablet, Take 81 mg by mouth daily., Disp: , Rfl:      calcium-vitamin D (CALCIUM-VITAMIN D) 500 mg(1,250mg) -200 unit per tablet, Take 1 tablet by mouth 2 (two) times a day with meals., Disp: , Rfl:      estradiol (ESTRACE) 1 MG tablet, Take 1 tablet (1 mg total) by mouth 2 (two) times a day., Disp: 180 tablet, Rfl: 3     GLUCOSAMINE HCL ORAL, Take 1 tablet by mouth daily., Disp: , Rfl:      lactobacillus rhamnosus GG (CULTURELLE) 10-15 Billion cell capsule, Take 1 capsule by mouth daily., Disp: , Rfl:      levothyroxine (SYNTHROID) 50 MCG tablet, Take 1 tablet (50 mcg total) by mouth daily., Disp: 90 tablet, Rfl: 3     multivitamin (MULTIVITAMIN) per tablet, Take 1 tablet by mouth daily., Disp: , Rfl:      OMEGA-3/DHA/EPA/FISH OIL (FISH OIL-OMEGA-3 FATTY ACIDS) 300-1,000 mg capsule, Take 1 g by mouth daily., Disp: , Rfl:      progesterone (PROMETRIUM) 100 MG capsule, Take 1 capsule (100 mg total) by mouth at bedtime., Disp: 90 capsule, Rfl: 3     SUMAtriptan (IMITREX) 25 MG tablet, Take 1 tablet (25 mg total) by mouth every 2 (two) hours as needed for migraine., Disp: 10 tablet, Rfl: 5.    RELEVANT FAMILY MEDICAL HISTORY:   Family neurologic history is significant for late-onset Alzheimer's disease in her mother, who began experiencing memory problems in her late 70s and  in her 80s after living in a nursing home for a couple of years. The patient's younger sister and some aunts also had memory problems. Her father  of a heart attack at a young age.     PSYCHIATRIC  "HISTORY:  Currently, the patient described her mood as \"pretty good,\" although she endorsed significant longstanding anxiety that began in early childhood. She added that she gets the \"what-if's\" particularly in the morning. She acknowledged ongoing stressors related to family issues. She is not currently participating in any mental health treatment; however, both the patient and her  have noticed slight improvement in her anxiety over the past couple of weeks, as she has found new coping strategies to use (reading a book and reading the Bible to distract her thoughts). Along with longstanding and ongoing anxiety, the patient reported a history of depression as well for which she was prescribed medications and participated in therapy several times throughout her life. She denied current symptoms of depression. SI/HI was also denied. She reported that her appetite is decreased. Her sleep is normal; she estimates that she is typically getting between 9-12 hours of sleep per night and reported that she feels adequately energized during the day. Symptoms of REM sleep behavior disorder were denied, although Adolfo stated that she occasionally talks in her sleep.    With regard to substance abuse, Ms. Garcia reported no history of past drug or alcohol abuse or dependence. Current substance use was also denied.    SOCIAL HISTORY:  Ms. Garcia was born and raised in [place], [place]. She graduated high school and completed one year of college at [employer] in [place]. She never earned a degree because she chose to work instead and did not like being away from [place]. She did eventually earn a two-year certificate in drug and alcohol abuse counseling. She earned mostly above average grades throughout her schooling. Significant learning difficulties or developmental attention issues were denied. She worked as an substance abuse counselor for a couple of years, but the emotional toll of the job was too great for " her. 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 She has been  for 54 years, and they have two children together and several grandchildren. The patient and her  live together in their own town home in [place], [place]. They also spend half of the year in a condo that they own in [place]. For leisure, the patient enjoys going to [place] every Saturday with family, doing crafts, reading, and watching TV at night.    SERVICES:   One hour of the neuropsychologist's time was spent performing a neurobehavioral status examination (86814); 3 hours of the neuropsychologist's time was spent in medical record review, administering the WMS-III Information and Orientation subtest, interpretating and integrating all tests, and in report preparation (74987); and 3 hours was spent in the administration of the remainder of the testing battery by a trained examiner and interpreted by the neuropsychologist (89623). For diagnostic and coding purposes, Ms. Garcia has a history of hypercholesterolemia, hearing loss, migraines, and osteopenia and was referred for an evaluation of mild neurocognitive disorder.    TESTS ADMINISTERED:   Wechsler Memory Scale-III Information/Orientation, Wechsler Adult Intelligence Scale-IV (select subtests), Wide Range Achievement Test-4 (select subtests), Wechsler Memory Test-IV (select subtests), Brief Visuospatial Memory Test-Revised, [place] Verbal Learning Test-Short Form, Trailmaking Test, Controlled Oral Word Association Test and Category Fluency, [place] Naming Test-Short Form,Clock Drawing Test, Lina Myers Executive Functioning Test (Color Word Interference subtest), [place] Card Sorting Test-1 deck, Generalized Anxiety Disorder-7 Assessment and Geriatric Depression Scale-Short Form.    BEHAVIORAL OBSERVATIONS:   Ms. Garcia arrived on time and accompanied by her  to today's appointment. She was appropriately dressed and  groomed. She appeared alert and engaged. Gait and other motor activity appeared normal. No vision or hearing difficulties were observed. Conversational speech was of normal rate, volume, and prosody. Occasional word-finding pauses were noted in conversation. Her thought process appeared generally  linear and goal-directed. However, she often referred to her  to help her answer questions during the clinical interview. No hallucinations or delusions were apparent. Judgment and insight appeared somewhat limited. Her mood was notably anxious about the evaluation. She became tearful several times during the interview about what the results might show and described a fear of having Alzheimer's like her mother. Her affect was appropriately reactive. Rapport was easily established and eye contact was appropriate. During testing, she was alert throughout. She was pleasant and cooperative throughout the evaluation, although notably anxious, tearful, and self-critical at times, particularly during tasks that were difficulty for her (e.g., memory tests). She was observed to be perseverative and slightly impulsive during testing. She understood test instructions without difficulty. Ms. Garcia appeared adequately motivated and engaged easily during the evaluation. Therefore, the following results are considered a valid estimation of her current cognitive abilities.    OPTIMAL PREMORBID INTELLECT:  Optimal premorbid intellectual abilities were estimated as falling in the average range based on Ms. Garcia's educational and occupational histories and performance on tasks least likely to be affected by acquired brain dysfunction.    SUMMARY OF TEST RESULTS:  ATTENTION/WORKING MEMORY. Performance on a measure sensitive to sustained auditory-verbal attention and concentration (WAIS-IV Digit Span) was in the average range, as she was able to recite up to 9 digits forward (average), up to 7 digits backward (average), and up to 6  digits in sequence (average). On a test of complex concentration that requires speeded numeric sequencing (Trails A), the patient performed in the average range and without error. However, on a more difficult version of that task that requires speeded alpha-numeric sequencing/cognitive set-shifting (Trails B), performance was in the impaired range. This task was ultimately discontinued as she went over the time limit; she made two set-loss errors and one sequencing error as well.     PROCESSING SPEED. On a measure of psychomotor speed and cancellation, the patient performed in the low average range (WAIS-IV Symbol Search). On the DKEFS Color-Word Interference Test, speeded color naming was mildly impaired, speeded word reading was average, and completion time on the inhibition subtest was impaired.      LANGUAGE PROCESSING. Language comprehension appeared intact. The WAIS-IV Verbal Comprehension Index was in the borderline impaired range (pro-rated VCI = 76), with low average performance on a subtest of verbal abstract reasoning (Similarities), and mildly impaired performance on a fund of information task (Information). The patient demonstrated borderline impaired performance on the [place] Naming Test, a test of visual confrontation naming and semantic retrieval. Her performance on that task was slightly aided by phonemic cues (+2/4). Phonemic fluency was in the average range (COWAT), while semantic fluency was moderately impaired. She made several repetition errors on the fluency tasks. Her writing sample was within normal limits and there was no evidence of micrographia.     VISUOSPATIAL/CONSTRUCTIONAL SKILLS. The WAIS-IV Perceptual Reasoning Index was in the low average range (pro-rated FAISAL = 86), with average nonverbal abstract reasoning (Matrix Reasoning), and low average visuoconstruction with three-dimensional blocks (Block Design). Copy of six simple geometric figures was within normal limits (BVMT-R Copy).  "On a Clock Drawing Task, the patient was able to draw a large, well-formed [place] with adequately spaced numbers and clock hands that converged nicely in the center of the clock face. The clock hands were also differentiated by length; however, she was unable to accurately represent analog time.     LEARNING/MEMORY. The patient was adequately oriented to personal and current information; however, she was 4 days off on the day of the month and was unable to name the former [place] President (although she was able to accurately describe him as a \"young man with two daughters\"). On the WMS-IV Logical Memory subtest, immediate memory for paragraph-length stories was mildly impaired while delayed memory was severely impaired with an 11% retention rate. She required cues about the stories for the delayed recall trial. However, her performance on the recognition trial of that same material was low average. On a 9-item verbal list-learning task ([place] Verbal Learning Test-II Short Form), the patient acquired up to 5 words (56%) of the word list by the 4th and final learning trial (raw scores over trials = 4, 3, 5, 5). Total learning acquisition was in the borderline impaired range. Following a distractor task, the patient recalled 4 items, which was in the borderline impaired range. After a 10-minute delay, the patient recalled 0 items, which was in the impaired range. Her recall  benefited only slightly from semantic cues (4 items; borderline impaired range). However, she stated that she was unable to recall ever having heard a word list before. Recognition discriminability was in the average range, as she recognized 8/9 items (average) and made 3 false positive errors (low average).     On a visual memory measure (BVMT-R), immediate recall of six simple geometric figures over three learning trials was in the impaired range (raw scores over trials = 2, 3, 3 out of 12). Delayed recall of the figures was severely impaired " (raw score = 1 out of 12) with a 33% retention rate. Recognition discriminability was in the low average range, as she correctly recognized 4/6 figures (borderline impaired) and made 0 false positive errors (average).     EXECUTIVE FUNCTIONS. Concept identification and the ability to generate alternative problem solving strategies was low average for age on the [place] Card Sorting Test. She completed one of three categories (low average) with a total of 33 errors, which is borderline impaired. Eighteen of her errors were perseverative (low average) and there was one failure to maintain set. On a test of inhibition and cognitive flexibility, (DKEFS Color-Word Interference Inhibition trial), the patient made 12 errors, which is in the impaired range. No errors were made on a test of speeded sequencing (Trails A); however, her performance on the more difficult task of speeded cognitive set-shifting was impaired and ultimately discontinued (Trails B). Verbal and nonverbal abstract reasoning were low average and average, respectively (WAIS-IV Similarities and Matrix Reasoning). Phonemic fluency was average, as measured by COWAT. Performance on the Clock Drawing Test was impaired, as she was unable to accurately represent analog time.     MOOD. On a self-report measure of depression (Geriatric Depression Scale - Short Form), the patient endorsed 0 items. This suggests depressive symptoms in the normal range. On the Generalized Anxiety Disorder-7, a self-report measure of anxiety, she obtained a score of 9,  placing her in the range of moderate anxiety.    DIAGNOSTIC IMPRESSIONS:  Ms. Garcia is a 72 year-old, right-handed,  female with history of hypercholesterolemia, hearing loss, migraines, and osteopenia. The patient initially expressed concern about memory and word-finding difficulties at an appointment with her PCP, Niurka Austin MD, in August 2016. While the patient initially declined a neuropsychological  evaluation, she continued to experience cognitive decline over the years and ultimately requested an evaluation. She was referred for this neuropsychological assessment by Dr. Austin to establish a neurocognitive baseline and to assist with differential diagnosis and care planning.    Optimal premorbid intellectual abilities are estimated as falling in the average range; however, several of Ms. Garcia's performances fall well below that estimate. Specifically, she exhibits mild to severe impairments on some measures of executive functioning (i.e., mental flexibility and cognitive inhibition) and semantic fluency. Further, she demonstrates mild learning inefficiency for both verbal and nonverbal information. With regard to memory, she exhibits a retrieval deficit, as her free recall of verbal and nonverbal material is impaired but her recall is significantly aided by recognition prompts/cues.This pattern of memory performance is more suggestive of prefrontal dysfunction as opposed to a primary memory disorder associated with mesial temporal compromise. Lastly, relative weaknesses are observed in novel problem solving and confrontation naming, which fall in the borderline impaired range. All other cognitive domains are within normal limits, including attention/concentration, processing speed, visuospatial/constructional ability, all other language processing skills, and orientation to personal/current information. With regard to emotional functioning, the patient denies significant depressive symptoms but acknowledges moderate anxiety on self-report questionnaires. She also reports ongoing stressors during the clinical interview.    Overall, these results are suggestive of mild to moderate cerebral dysfunction in the bilateral prefrontal regions, along with possible involvement of the lateral dominant (presumably left) temporal lobe to a lesser degree. As noted above, mesial temporal structures appear to be somewhat  spared. Given her history, course of symptoms, and that she reportedly remains independent in daily activities, she appears to meet criteria for Mild Neurocognitive Disorder. Further evaluation is required to clarify etiology, such as an updated brain MRI to rule out cerebrovascular contributors or other lesions, and updated blood work to rule out reversible/medical causes of cognitive impairment. However, if medical causes are ruled out, possible considerations include an underlying neurodegenerative syndrome affecting primarily the cortical frontal regions. Her memory performances are less compelling for an Alzheimer's etiology, and her profile is not strongly supportive of a cerebrovascular etiology given her intact processing speed and attention abilities. I do suspect that her moderate anxiety symptoms may be exacerbating her impairments to some degree.     RECOMMENDATIONS:  1) If her physician deems it appropriate, a brain MRI could be considered to help clarify the possible etiology of Ms. Garcia's cognitive difficulties. Updated blood work, such as vitamin B12, folate, and TSH should also be considered to rule out reversible causes of memory impairment.    2) Consider a referral to neurology or to Dr. Darvin Tarango in the [facility] to further evaluate the etiology of her cognitive dysfunction and to monitor the patient over time.    3) Given her significant symptoms of anxiety, a trial of medication may be warranted if not medically contraindicated. Given Ms. Garcia' significant cognitive impairment, psychotherapy would likely not be as helpful. However, relaxation strategies and behavioral activation techniques such as regular exercise, recreational activities, and regular social interaction would likely be very effective in helping to manage her anxiety. Improvements in anxiety may lead to some improvement in her overall cognitive efficiency over time.    4) Ms. Garcia reported recent  difficulties with her hearing. She may benefit from an audiological evaluation to determine her need for a hearing aide or other hearing interventions.    5) Given her profile characterized by executive dysfunction, a formal driving evaluation could be considered. Possible options for formal driving evaluations would be: [facility] ([phone]), [facility] ([phone]) or any option recommended by her physician. In the meantime, it is suggested that she restrict her driving to familiar locations, avoid high traffic areas or rapidly changing traffic patterns, and drive only during the day.     6) Given that Ms. Garcia' recall is aided by cues/prompts, she may benefit from implementing cognitive compensatory strategies, such as utilizing note pads, checklists, to-do lists, calendars/planners, alarm reminders, and maintaining a routine and an organized living environment.    7) If not already done, completion of paperwork for advance directives and assignment of healthcare and financial power of  should be considered at this time.    8) Repeat neuropsychological evaluation is recommended in 12-18 months, or as clinically indicated, in order to monitor the patient's cognitive status and to help clarify etiology.    Ms. Garcia has requested to receive the results of this evaluation via a formal feedback appointment with me, which will be scheduled at the patient's convenience, typically within two weeks of today's date. Thank you for allowing me to participate in Ms. Garcia's care. Please contact me with any questions regarding the content of this report.        Meg Hurt PsyD, TANNER  Licensed Clinical Neuropsychologist  [facility]  Neuropsychology Section   Phone:  [phone]

## 2021-06-19 NOTE — PROGRESS NOTES
Assessment and Plan:   Annual wellness  Cognitive dysfunction    1. Elevated TSH     - Thyroid Cascade; Future    2. Mild neurocognitive disorder  Discussed results of the assessment in detail and planned for another visit to discuss the anxiety component    - Comprehensive Metabolic Panel; Future  - HM2(CBC w/o Differential); Future  - Vitamin D, Total (25-Hydroxy); Future  - Vitamin B12; Future  - Folate, Serum; Future  - Ambulatory referral to Audiology  - Ambulatory referral to Dementia/Memory Loss Clinic  - Thyroid Cascade; Future  - Lyme Antibody Cascade; Future  - Syphilis Screen, Cascade; Future    3. Menopausal Disorder   she was well informed again of the risks for hypercoagulability and breast cancer and she is continuing to be willing to take those risks because it makes her feel so much better.  I have told her that we will revisit that every year.    - estradiol (ESTRACE) 1 MG tablet; Take 1 tablet (1 mg total) by mouth 2 (two) times a day.  Dispense: 180 tablet; Refill: 3  - progesterone (PROMETRIUM) 100 MG capsule; Take 1 capsule (100 mg total) by mouth at bedtime.  Dispense: 90 capsule; Refill: 3    4. Osteopenia       5. Essential Hypercholesterolemia     - Lipid Cascade; Future    6. Menopausal and postmenopausal disorder     - estradiol (ESTRACE) 1 MG tablet; Take 1 tablet (1 mg total) by mouth 2 (two) times a day.  Dispense: 180 tablet; Refill: 3  - progesterone (PROMETRIUM) 100 MG capsule; Take 1 capsule (100 mg total) by mouth at bedtime.  Dispense: 90 capsule; Refill: 3    7. Routine general medical examination at a health care facility  Discussed healthcare maintenance issues and she will check with her insurance to see if shingle ricks is covered under her insurance.        The patient's current medical problems were reviewed.    I have had an Advance Directives discussion with the patient.  The following health maintenance schedule was reviewed with the patient and provided in printed  form in the after visit summary:   Health Maintenance   Topic Date Due     DEPRESSION FOLLOW UP  02/23/2018     FALL RISK ASSESSMENT  05/12/2018     INFLUENZA VACCINE RULE BASED (1) 08/01/2018     TD 18+ HE  08/05/2019     DXA SCAN  09/14/2018     MAMMOGRAM  07/27/2020     ADVANCE DIRECTIVES DISCUSSED WITH PATIENT  08/23/2022     COLONOSCOPY  08/09/2026     PNEUMOCOCCAL POLYSACCHARIDE VACCINE AGE 65 AND OVER  Completed     PNEUMOCOCCAL CONJUGATE VACCINE FOR ADULTS (PCV13 OR PREVNAR)  Completed     ZOSTER VACCINE  Completed        Subjective:   Chief Complaint: Roselyn Garcia is an 72 y.o. female here for an Annual Wellness visit.   HPI: She is here with her  to have her annual wellness exam.  She had also requested formal neuropsychological testing a couple of weeks ago because while she denied the need for it last year she now thinks that she is ready for that.  We reviewed that in detail today because she has mild cognitive deficit noted.  They want to recheck in 12-18 months.  They are suggesting some lab work that we would do anyway.  They are suggesting a visit with a physician at the memory loss clinic through [facility].  They are suggesting an MRI but since she has an implantable InterStim for her bladder incontinence, I am in a need to find out if that is even possible to do a brain MRI with that.    She also does think that she is hard of hearing and we put in an audiology referral.    She will come back at a different visit to discuss her anxiety because she really think she needs to go on a medication for that.    She thinks that when she is driving in town locally during the day she does not have trouble driving but we have resources available if she needs a referral to encourage [facility] or to another rehab facility to make sure that she is safe to drive.    She and her  are on a hard deadline to go back to [facility] in October and that is not at all flexible.  There is health care down  there that they might be able to access because her  is seen at [facility] for his lymphoma.    Review of Systems:     Please see above.  The rest of the review of systems are negative for all systems.    Patient Care Team:  Niurka Austin MD as PCP - General     Patient Active Problem List   Diagnosis     Essential Hypercholesterolemia     Osteopenia     Migraine Headache     Menopausal Disorder     Imaging Studies Nonspecific Abnormal Findings Breast     Elevated TSH     Mild neurocognitive disorder     History reviewed. No pertinent past medical history.   Past Surgical History:   Procedure Laterality Date     BREAST BIOPSY       HYSTERECTOMY  1970's     [place] APPENDECTOMY      Description: Appendectomy;  Recorded: 10/08/2008;     [place] BREAST SURGERY PROCEDURE UNLISTED      Description: Breast Surgery;  Recorded: 10/08/2008;     [place] REMOVAL OF TONSILS,<12 Y/O      Description: Tonsillectomy;  Recorded: 10/08/2008;     [place] REV UPPER EYELID W EXCESS SKIN      Description: Blepharoplasty Upper Lid W/ Excessive Skin;  Recorded: 08/02/2013;     [place] VAGINAL HYSTERECTOMY,UTERUS 250 GMS/<      Description: Vaginal Hysterectomy;  Recorded: 08/19/2013;  Comments: prolapse.....has ovaries     [place] VAGINAL HYSTERECTOMY,UTERUS 250 GMS/<      Description: Vaginal Hysterectomy;  Recorded: 08/15/2014;  Comments: prolapse.....has ovaries     SACRAL NERVE STIMULATOR PLACEMENT  07/31/2013    Dr Campo      History reviewed. No pertinent family history.   Social History     Social History     Marital status:      Spouse name: Adolfo     Number of children: 2     Years of education: N/A     Occupational History     retired      Social History Main Topics     Smoking status: Never Smoker     Smokeless tobacco: Never Used     Alcohol use No     Drug use: No     Sexual activity: Yes     Partners: Male     Birth control/ protection: Post-menopausal     Other Topics Concern     Not on file     Social History Narrative    Anabaptist     "  Current Outpatient Prescriptions   Medication Sig Dispense Refill     aspirin 81 MG EC tablet Take 81 mg by mouth daily.       calcium-vitamin D (CALCIUM-VITAMIN D) 500 mg(1,250mg) -200 unit per tablet Take 1 tablet by mouth 2 (two) times a day with meals.       estradiol (ESTRACE) 1 MG tablet Take 1 tablet (1 mg total) by mouth 2 (two) times a day. 180 tablet 3     GLUCOSAMINE HCL ORAL Take 1 tablet by mouth daily.       lactobacillus rhamnosus GG (CULTURELLE) 10-15 Billion cell capsule Take 1 capsule by mouth daily.       levothyroxine (SYNTHROID) 50 MCG tablet Take 1 tablet (50 mcg total) by mouth daily. 90 tablet 3     multivitamin (MULTIVITAMIN) per tablet Take 1 tablet by mouth daily.       OMEGA-3/DHA/EPA/FISH OIL (FISH OIL-OMEGA-3 FATTY ACIDS) 300-1,000 mg capsule Take 1 g by mouth daily.       progesterone (PROMETRIUM) 100 MG capsule Take 1 capsule (100 mg total) by mouth at bedtime. 90 capsule 3     SUMAtriptan (IMITREX) 25 MG tablet Take 1 tablet (25 mg total) by mouth every 2 (two) hours as needed for migraine. 10 tablet 5     No current facility-administered medications for this visit.       Objective:   Vital Signs:   Visit Vitals     /84 (Patient Site: Right Arm, Patient Position: Sitting, Cuff Size: Adult Regular)     Pulse 76     Ht 5' 2\" (1.575 m)     Wt 116 lb 11.2 oz (52.9 kg)     Breastfeeding No     BMI 21.34 kg/m2        VisionScreening:  No exam data present     PHYSICAL EXAM  General Appearance: Alert, cooperative, no distress, appears stated age, teary  Head: Normocephalic, without obvious abnormality, atraumatic  Eyes: PERRL, conjunctiva/corneas clear, EOM's intact  Ears: Normal TM's and external ear canals, both ears  Nose: Nares normal, septum midline,mucosa normal, no drainage  Throat: Lips, mucosa, and tongue normal; teeth and gums normal  Neck: Supple, symmetrical, trachea midline, no adenopathy;  thyroid: not enlarged, symmetric, no tenderness/mass/nodules   Back: Symmetric, " no curvature, ROM normal, no CVA tenderness  Lungs: Clear to auscultation bilaterally, respirations unlabored  Breasts: No breast masses, tenderness, asymmetry, or nipple discharge.  Heart: Regular rate and rhythm, S1 and S2 normal, no murmur, rub, or gallop, Abdomen: Soft, non-tender, bowel sounds active all four quadrants,  no masses, no organomegaly  Pelvic:Not examined  Extremities: Extremities normal, atraumatic, no cyanosis or edema  Skin: Skin color, texture, turgor normal, no rashes or lesions  Lymph nodes: Cervical, supraclavicular, and axillary nodes normal  Neurologic: Normal     Assessment Results 8/16/2018   Activities of Daily Living No help needed   Instrumental Activities of Daily Living No help needed   Mini Cog Total Score 3   Some recent data might be hidden     A Mini-Cog score of 0-2 suggests the possibility of dementia, score of 3-5 suggests no dementia  She has had formal evaluation and does have cognitive deficit    Identified Health Risks:     The patient was provided with written information regarding signs of hearing loss.  Patient's advanced directive was discussed and I am comfortable with the patient's wishes.

## 2021-06-19 NOTE — PROGRESS NOTES
NEUROPSYCHOLOGY PROGRESS NOTE    NAME: Roselyn Garcia  YOB: 1945     DATE OF EVALUATION: 8/14/2018      SUMMARY OF SESSION:  Roselyn Garcia is a 72 y.o.,  female who was referred for a cognitive evaluation by her PCP Niurka Austin MD.  Ms. Garcia arrived on time and accompanied by her . We began the session by discussing her experience during the neuropsychometric evaluation.  I provided Ms. Garcia with detailed feedback regarding her performance on cognitive testing and her pattern of cognitive strengths and weaknesses.  I discussed my overall impressions and recommendations and provided the opportunity for Ms. Garcia to ask any questions that she had about the evaluation.  At the end of the session, she indicated that she understood the results and that I had answered all of her questions.  She was provided with my contact information, should any further questions or concerns arise in the future.    Please contact me with any questions regarding the content of this note.     Meg Hurt PsyD, LP  [place]ensed [place] [address]  Phone: [phone]    For diagnostic and coding purposes, Roselyn Garcia was referred for an evaluation of mild neurocognitive disorder.  This appointment consisted of a total of 1 hour of 22946.

## 2021-06-20 NOTE — LETTER
Letter by Betty Rockwell RDCS at      Author: Betty Rockwell RDCS Service: -- Author Type: --    Filed:  Encounter Date: 6/1/2020 Status: (Other)         Roselyn Garcia  [address] [address]      June 1, 2020      Dear Ms. Chatobenoit,    RE: Remote Results    We are writing to you regarding your recent Remote Pacemaker check from home. Your transmission was received successfully. Battery status is satisfactory at this time.     Your results are showing no significant changes.    Your next device appointment will be a remote check on September 1st, 2020; this will occur automatically.    To schedule or reschedule, please call [phone] and press 1.    NOTE: If you would like to do an extra transmission, please call [phone] and press 3 to speak to a nurse BEFORE transmitting. This ensures that the Device Clinic staff is aware of the reason you are sending a transmission, and can follow-up with you after it has been reviewed.    We will be checking your implanted device from home (remotely) every three months unless otherwise instructed. We will need to see you in the clinic at least once a year. You may need to be seen in the clinic sooner depending on the results of your check.    Please be aware:    The follow-up schedule is like a Physician prescription.    Your remote monitor is paired to your specific implanted device.      Sincerely,    [facility]

## 2021-06-20 NOTE — PROGRESS NOTES
Chief complaint: Anxiety    HPI: The patient is here with her  to talk about some anxiety.  She has had anxiety probably her whole life and she remembers even back in high school having anxiety but I am not sure she is ever been treated for that.    Recently she has had more memory issues and had a formal neuropsychological evaluation completed which did show mild cognitive impairment.  We have done a metabolic workup, she is going to have an audiology evaluation today, she is going to have an MRI of her brain tomorrow and she is going to see the physician at the memory loss clinic at [place] when they are back to [place] in December.  They leave to go to [place] in October.  She is some what disconcerted by this diagnosis as one would expect.  She now is ready to do something about the anxiety make sure that is not contributing to some of her memory loss and memory issues.    Objective:/78 (Patient Site: Right Arm, Patient Position: Sitting, Cuff Size: Adult Regular)  Pulse 76  Breastfeeding? No  FLORENCE 7 was completed and does show her to be anxious she does not have depressive symptoms.    We talked at length about using SSRI medications to help manage the anxiety and cope with her life stressors.  We are moving on with the evaluations for other medical causes so she is comfortable with that so at least have a plan in place.  She is willing to do a daily medication to try to help with her anxiety.    Assessment: Anxiety  Hypothyroidism controlled  Mild neurocognitive deficits    Plan: Our metabolic workup is ongoing and the medical workup is ongoing but we will start her on citalopram 10 mg tablets 1/2-1 tablet daily as directed.  I will see her the end of September before they go back to [place] to make sure that we are on the right track with managing her anxiety.  She does not have panic attacks so does not need something for that but they were grateful for the time today we spent over 30  minutes today and all that time was counseling care coordination and discussion

## 2021-06-20 NOTE — PROGRESS NOTES
"Audiology only; referred by Niurka Austin    History:  Ms. Garcia presented today in the company of her spouse. She has noted increased difficulty hearing within the past year, and has been diagnosed with mild neurocognitive disorder. She noted constant, bilateral, non-pulsatile tinnitus \"for as long as I can remember\", but that it does not interfere with daily activities or prevent her from easily falling asleep. She denied vertigo, otalgia, recent illness, aural fullness, or history of significant noise exposure.    Results:  Otoscopy was unremarkable in either ear, save for non-occluding cerumen in the right ear.  Hearing sensitivity was assessed with good reliability using circumaural phones.      Right ear:   Normal/borderline normal hearing sensitivity for 250-1500 Hz, sloping to mild to severe sensorineural hearing loss for 1197-2170 Hz.   Left ear:  Normal/borderline normal hearing sensitivity for 250-1500 Hz, sloping to moderate to profound sensorineural hearing loss for 9512-8923 Hz.  Slight hearing asymmetry was noted between ears; Reinaldo was negative at 6000 Hz.      Speech reception thresholds showed agreement with pure tone findings in each ear. Word recognition ability was excellent, bilaterally, with presentation levels above typical conversational volume in both ears.    Tympanometry was not assessed today.    Recommendations:  Follow-up with PCP; retest hearing per medical management or patient concern.  Wear hearing protection consistently in noise to preserve residual hearing sensitivity.  Roselyn Garcia is a potential binaural amplification candidate for both hearing loss and tinnitus, if patient motivation exists and medical clearance is granted. Common tinnitus triggers and management strategies were discussed at length, as were appropriate communication strategies. A copy of the [facility] consumer brochure on the purchase of amplification was provided and discussed. Today's test results are valid " for a period of six months for the purpose of obtaining amplification. Hearing aid evaluation may be scheduled as next step, if desired. Due to a departure to [place] in October, she indicated she may wait until returning to [place] next spring. Both Ms. Garcia and her  expressed verbal understanding of this information and plan.    Sharmila Bond., [facility]  [place] Licensed Audiologist [id number]

## 2021-06-20 NOTE — PROGRESS NOTES
Chief complaint: Follow-up citalopram    HPI: The patient feels wonderful on citalopram.  She used 5 mg for 2 weeks and then increase to the 10 mg and she feels as though she is more normal and more herself.    She had undergone a workup for some mild cognitive impairment.  She sees Dr. Tarango in December when they come back from [place].  Further workup included an MRI of her brain and they wanted me to review that with her.  She has some cerebral and cerebellar atrophy and she has some evidence of white tissue change likely from chronic small vessel ischemic disease.  She is never had high blood pressure but has had hyperlipidemia.  She is tried atorvastatin twice and absolutely did not tolerate that at all.  They are asking if there is another option I do not think Zetia would lower her cholesterol enough so we discussed the Pravachol is metabolized to a different enzyme system of the liver so they wanted to try that.    Objective:/70 (Patient Site: Right Arm, Patient Position: Sitting, Cuff Size: Adult Regular)  Pulse 68  Breastfeeding? No  PHQ 9 and FLORENCE 7 were looking very good.  Her affect is quite bright.    Assessment: Anxiety medication management  Mild dementia doing well  Hyperlipidemia    Plan: We will continue the citalopram at 10 mg a day and see her back in December.  She is due to see Dr. Tarango in December to discuss whether or not another medication would be helpful for the cognitive decline.    She is requesting to be put on another medication for cholesterol so we will try Pravachol and when she returns in December, she will do fasting lipids and comprehensive metabolic panel to see if she is tolerating that and to see how much it lowers her cholesterol level.

## 2021-06-21 NOTE — LETTER
Letter by Jaylyn Oates at      Author: Jaylyn Oates Service: -- Author Type: --    Filed:  Encounter Date: 5/4/2021 Status: (Other)         Roselyn Garcia  [address] [address]      May 4, 2021      Dear Ms. Garcia,    RE: Remote Results    We are writing to you regarding your recent Remote Pacemaker check from home. Your transmission was received successfully. Battery status is satisfactory at this time.     Your results are showing no significant changes.    Your next device appointment will be a clinic visit.  Please call in June to schedule.      To schedule or reschedule, please call [phone] and press 1.    NOTE: If you would like to do an extra transmission, please call [phone] and press 3 to speak to a nurse BEFORE transmitting. This ensures that the Device Clinic staff is aware of the reason you are sending a transmission, and can follow-up with you after it has been reviewed.    We will be checking your implanted device from home (remotely) every three months unless otherwise instructed. We will need to see you in the clinic at least once a year. You may need to be seen in the clinic sooner depending on the results of your check.    Please be aware:    The follow-up schedule is like a Physician prescription.    Your remote monitor is paired to your specific implanted device.      Sincerely,    [facility]

## 2021-06-21 NOTE — LETTER
Letter by Sola Calderon RDCS at      Author: Sola Calderon RDCS Service: -- Author Type: --    Filed:  Encounter Date: 2/2/2021 Status: (Other)         Roselyn Garcia  [address] [address]      February 2, 2021      Dear Ms. Garcia,    RE: Remote Results    We are writing to you regarding your recent Remote Pacemaker check from home. Your transmission was received successfully. Battery status is satisfactory at this time.     Your results are showing no significant changes.    Your next device appointment will be a remote check on May 4, 2021; this will occur automatically.    To schedule or reschedule, please call [phone] and press 1.    NOTE: If you would like to do an extra transmission, please call [phone] and press 3 to speak to a nurse BEFORE transmitting. This ensures that the Device Clinic staff is aware of the reason you are sending a transmission, and can follow-up with you after it has been reviewed.    We will be checking your implanted device from home (remotely) every three months unless otherwise instructed. We will need to see you in the clinic at least once a year. You may need to be seen in the clinic sooner depending on the results of your check.    Please be aware:    The follow-up schedule is like a Physician prescription.    Your remote monitor is paired to your specific implanted device.      Sincerely,    [facility]

## 2021-06-21 NOTE — LETTER
Letter by Nadiya Gomez CHW at      Author: Nadiya Gomez CHW Service: -- Author Type: --    Filed:  Encounter Date: 2/26/2021 Status: (Other)       CARE COORDINATION  [address] [address]    February 26, 2021    Roselyn Garcia  [address] [address]      Dear Roselyn,    I am a clinic community health worker who works with Niurka Austin MD at [facility]. I wanted to thank you for spending the time to talk with me.  Below is a description of clinic care coordination and how I can further assist you.      The clinic care coordination team is made up of a registered nurse,  and community health worker who understand the health care system. The goal of clinic care coordination is to help you manage your health and improve access to the health care system in the most efficient manner. The team can assist you in meeting your health care goals by providing education, coordinating services, strengthening the communication among your providers and supporting you with any resource needs.    Please feel free to contact me at [phone] with any questions or concerns. We are focused on providing you with the highest-quality healthcare experience possible and that all starts with you.     Sincerely,     MOSES Helm  Clinic Care Coordination   [phone]  [email or web address]

## 2021-06-21 NOTE — LETTER
Letter by Lavon Boyd MD at      Author: Lavon Boyd MD Service: -- Author Type: --    Filed:  Encounter Date: 5/3/2021 Status: (Other)         Roselyn Garcia  [address] [address]             May 3, 2021         Dear Ms. Chatobenoit,    Below are the results from your recent visit: Covid test is negative.    Resulted Orders   COVID-19 Virus PCR MRF   Result Value Ref Range    COVID-19 VIRUS SPECIMEN SOURCE Nasopharyngeal     2019-nCOV       Test received-See reflex to IDDL test SARS CoV2 (COVID-19) Virus RT-PCR      Comment:        Performed and/or entered by:  [address] [id number]    SARS-CoV-2 (COVID-19)-PCR   Result Value Ref Range    SARS-CoV-2 Virus Specimen Source Nasopharyngeal     SARS-CoV-2 PCR Result NEGATIVE       Comment:      SARS-CoV2 (COVID-19) RNA not detected, presumed negative.    SARS-COV-2 PCR COMMENT       Testing was performed using the Aptima SARS-CoV-2 Assay on the [employer]      Comment:      Instrument System. Additional information about this Emergency Use  Authorization (EUA) assay can be found via the Lab Guide.  This test should be ordered for the detection of SARS-CoV-2 in individuals who  meet SARS-CoV-2 clinical and/or epidemiological criteria. Test performance is  unknown in asymptomatic patients.  This test is for in vitro diagnostic use under the FDA EUA for laboratories  certified under CLIA to perform high complexity testing. This test has not been  FDA cleared or approved.  A negative result does not rule out the presence of PCR inhibitors in the  specimen or target RNA in concentration below the limit of detection for the  assay. The possibility of a false negative should be considered if the  patient's recent exposure or clinical presentation suggests COVID-19.  This test was validated by the [facility]. This laboratory is certified under the  Clinical Laboratory  Improvement Amendments of 1988 (CLIA-88) as  qualified to perform high  complexity laboratory testing.    Performed and/or entered by:  [facility]  [address] [place] [address]            Please call with questions or contact us using [employer].    Sincerely,        Electronically signed by Lavon Boyd MD

## 2021-06-21 NOTE — LETTER
Letter by Robles Hale RN at      Author: Robles Hale RN Service: -- Author Type: --    Filed:  Encounter Date: 11/2/2020 Status: (Other)         Roselyn Garcia  [address] [address]      November 2, 2020      Dear Ms. Jose,    RE: Remote Results    We are writing to you regarding your recent Remote Pacemaker check from home. Your transmission was received successfully. Battery status is satisfactory at this time.     Your results are showing no significant changes.    Your next device appointment will be a remote check on 2/2/2021; this will occur automatically.    To schedule or reschedule, please call [phone] and press 1.    NOTE: If you would like to do an extra transmission, please call [phone] and press 3 to speak to a nurse BEFORE transmitting. This ensures that the Device Clinic staff is aware of the reason you are sending a transmission, and can follow-up with you after it has been reviewed.    We will be checking your implanted device from home (remotely) every three months unless otherwise instructed. We will need to see you in the clinic at least once a year. You may need to be seen in the clinic sooner depending on the results of your check.    Please be aware:    The follow-up schedule is like a Physician prescription.    Your remote monitor is paired to your specific implanted device.      Sincerely,    [facility]

## 2021-06-22 NOTE — PROGRESS NOTES
Chief complaint: Medication check    HPI: The patient has some anxiety that has become more more crippling as she has aged.  She also has some early onset dementia and she with the physician at [facility] with neurocognitive testing.  They just saw him earlier this week and she will be having a very specific PET scan to see if they can discern which of the top most frequent dementia types she might have so they have a better way of treating that.    She has hyperlipidemia was not able to tolerate Lipitor but we did decide to try Pravachol her and she had her labs completed yesterday which showed a very dramatic decrease in total cholesterol and the LDL cholesterol she is not have any side effects from that.    She is taking a very low-dose citalopram for the anxiety and she feels like a different person.  She will wake up in the morning and not be anxious or nervous or nauseous.  She is coping a lot better with everything so she is thrilled with the dose of that.    Objective:/68 (Patient Site: Right Arm, Patient Position: Sitting, Cuff Size: Adult Regular)   Pulse (!) 114   Wt 121 lb 9.6 oz (55.2 kg)   SpO2 99%   BMI 22.24 kg/m    Her affect is bright she is enthusiastic and interactive.  Her  also agrees that she is doing much better and they like this combination of medications.  The physician at [facility] has ensured that she has the PET scan and the follow-up with him before they go back to [place] for the winter.    Assessment: Anxiety doing fabulously well on citalopram  Hyperlipidemia doing very well on pravastatin  Mild cognitive delay undergoing a workup with Dr. Tarango at [facility]    Plan: We will see her again at the end of April for medication check after they return from [place].

## 2021-06-22 NOTE — PROGRESS NOTES
Persons accompanying you (the patient) today:  Huang     How have you been doing since we saw you last? Please note any concerns.  Pt is here to go over pet scan from Tuesday.     Please list any recent hospitalizations/surgeries/procedures you've had since we saw you last:  None    Have you had any falls since your last visit? No    Do you have any pain today? No

## 2021-06-22 NOTE — PROGRESS NOTES
.OUT PATIENT-CLINICAL SOCIAL WORK PROGRESS NOTE    12/21/18           Roselyn Garcia is a 73 y.o. female accompanied to her appointment her  Huang.  Patient reports having difficulty with short-term memory.       ASSESSMENT: Patient appears pleasant and cooperative during today's visit.      PLAN: Monitor for needs to be available for any further questions or concerns as well as resources.    Type of Service: Office Visit    Services Provided: Resources    Resources Provided: Community Programs     Alexia Ansari [facility]  12/21/18  1600

## 2021-06-22 NOTE — PROGRESS NOTES
Assessment / Impression   1.  Neurodegenerative dementia, likely dementia the Alzheimer type at an MCI level of impairment  2.  Other medical problems as noted      Plan:   1.  Begin donepezil 5 mg p.o. nightly times 30 days then 10 mg p.o. nightly thereafter  2.  Wellness programming  3.  OT evaluation and return to clinic  4.  Discuss driving on return to clinic    A long conversation with the patient and  Huang in attendance.  Metabolic brain scan reveals predominantly left temporoparietal and posterior cingulate hypometabolism but also hypometabolism in the anterior and lateral left temporal lobe as well.  Basal ganglia appear well preserved as are other cortical areas.  I explained that, although not classic, the patient's constellation of clinical findings, neuropsychometric test results and results of structural and functional brain imaging are most consistent with an Alzheimer process.  Of interest, the patient's difficulty with semantic knowledge appears to correspond quite well with what appears to be significant decrease metabolic activity in the mid and anterior portions of the left temporal lobe.  With these thoughts in mind I outlined a treatment program for the patient and I also discussed some of the reasons we may find a somewhat atypical pattern of hypometabolism on brain scan.  All questions were answered.    Total time for evaluation one hour with majority of time spent in counseling.  All questions answered.      Subjective:     HPI: Roselyn Garcia is a 73 y.o. female with above-noted diagnoses who returns for follow-up.  Metabolic brain scan was reviewed with results as noted above.  No new complaints noted          Review of Systems:          As above        Objective:   There were no vitals filed for this visit.    No results found for this or any previous visit (from the past 24 hour(s)).    Physical Exam: As noted previously.

## 2021-06-22 NOTE — PROGRESS NOTES
.OUT PATIENT-CLINICAL SOCIAL WORK PROGRESS NOTE      12/17/2018           Roselyn Garcia is a 73 y.o. female who presents in out patient clinic to see Dr. Tarango along with her spouse, Adolfo.  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  Roselyn had neurocognitive testing in July of 2018 and a MRI of the brain in August of 2018. She presents with symptoms of short term memory issues and repetitive questions. Adolfo confirms that he and the their children have noticed the symptoms that patient is referencing. Roselyn complains of headaches for the last 6 months or so, occurring on almost a daily basis. Patient states she sleeps well obtaining 8-9 hours each night, and she feels she has a good support system in place with family and friends. Patient would like to discuss test results and understand what medications are available.       ASSESSMENT: Patient appears pleasant and cooperative.      PLAN: Patient to follow up with  if further resources are needed.    Type of Service: Office Visit    Services Provided: Assessment    Resources Provided: Informed about Social Work resourcs and contact information given    B[place]y [place] Social Work Intern  12/17/18 8:54    I have read, discussed, and agree with the documentation as presented by B[place]y [place] Social Work Intern.    Alexia Ansari, [facility]  12/17/18

## 2021-06-25 NOTE — TELEPHONE ENCOUNTER
Spoke to pt regarding Dr. Forrester's recommendations, he states understanding and is very happy that he will be able to just do remote device checks instead of coming into the clinic.  I did discuss that if anything comes up on the remote device checks that in clinic visit may be recommended after.  He stated understanding and will cross that bridge when/if it comes.  He will let the care team at the Memory Care Facility know today as he is on his way there now for a care conference.    No further questions or concerns at this time,    Courtney

## 2021-06-25 NOTE — TELEPHONE ENCOUNTER
Spoke to pt regarding Dr. Forrester's recommendations.  He was very thankful and agrees to plan.  He is heading over to the memory care facility right now for a care conference.    No further questions or concerns at this time.    Courtney

## 2021-06-25 NOTE — TELEPHONE ENCOUNTER
Dr. Forrester,     Patient's  called asking if patient can just do remote checks only, no in office checks. She is currently in a memory care unit and was informed by her home care facility to contact the cardiac clinic to see if patient is able to do remotes only. Please review patient chart and give further recommendations.     Thank you,    Rizwana Cha, ELLIOTT  Device Nurse

## 2021-06-25 NOTE — TELEPHONE ENCOUNTER
Phone call from RN at patient's senior living.  Pt was in the ER last night with abdominal pain, work up was negative and she was sent home.  While she was there a chest xray was completed.  The Radiologist noted that her pacemaker is in an inverted position.  Device check on 5-4-21 was normal.  Follow up with Cardiologist was recommended but the patients  does not want to take her out if she doesn't need to.    Will review this with Dr. Forrester and the device team and call Irais back with recommendations.

## 2021-06-25 NOTE — TELEPHONE ENCOUNTER
RN cannot approve Refill Request    RN can NOT refill this medication med is not covered by policy/route to provider. Last office visit: 2/18/2021 Niurka Austin MD Last Physical: 10/12/2020 Last MTM visit: Visit date not found Last visit same specialty: 4/30/2021 Lavon Boyd MD.  Next visit within 3 mo: Visit date not found  Next physical within 3 mo: Visit date not found      Dhara Galdamez, [facility] Triage/Med Refill 5/26/2021    Requested Prescriptions   Pending Prescriptions Disp Refills     CALCIUM-VITAMIN D 500 mg(1,250mg) -200 unit per tablet [Pharmacy Med Name: CALC CARB 500MG + D 200IU TAB] 60 tablet 11     Sig: TAKE 1 TABLET BY MOUTH TWICE DAILY WITH MEALS       There is no refill protocol information for this order

## 2021-06-26 NOTE — TELEPHONE ENCOUNTER
Will need to figure out who the prescribing provider is so we can stop sending the requests to Dr. Austin.    Annette Gilliland, CMA

## 2021-06-29 NOTE — PROGRESS NOTES
Progress Notes by Rizwana Cha RN at 6/9/2020  9:50 AM     Author: Rizwana Cha RN Service: -- Author Type: Registered Nurse    Filed: 6/9/2020 10:46 AM Encounter Date: 6/9/2020 Status: Signed    : Rizwana Cha RN (Registered Nurse)       DEVICE TELEPHONE RN POST-OP NOTE    Reason for visit: 1 week post-op pacemaker and wound check.      Device: Medtronic [id number] [place] XT DR MRI  Procedure date: 5/31/2020  Implant Diagnosis: SSS, Sinus node dysfunction, bradycardia.   Implanting Physician: Dr. Forrester.       Assessment  Subjective: Denies pain, shortness of breath, and nausea.   Vitals: Wt 118 lb (53.5 kg)   LMP 07/29/1972   BMI 21.58 kg/m      Incision/device pocket: Patient instructed to remove the steri-strips and cleanse with soap and water.  Per patient the incision is clean, dry and intact. There are no signs of infection present. The incision is well healed.              Device Findings  Interrogation of device reveals normal sensing and capture thresholds  See the Paceart Report for a full summary of the device information.      Patient Education    [facility]'s Partnership Agreement for Device Patients and Post-operative Checklist were presented and reviewed with patient at today's appointment.    Signs and symptoms of infection, poor wound healing, and device function were reviewed. Range of motion and weight restrictions for the left side are 4 weeks. She was issued a Carelink remote monitor and instructed on its set-up and use for remote monitoring by the [employer] representative prior to hospital discharge. These instructions were reviewed with the patient at todays visit.       Plan  -3 month post-op at [facility] with device nurse on 8/4/2020 at 9:20am.

## 2021-06-30 NOTE — PROGRESS NOTES
Progress Notes by Lavon Boyd MD at 4/30/2021 10:00 AM     Author: Lavon Boyd MD Service: -- Author Type: Physician    Filed: 4/30/2021 11:32 AM Encounter Date: 4/30/2021 Status: Signed    : Lavon Boyd MD (Physician)       FEMALE PREVENTATIVE EXAM    Assessment and Plan:     Patient has been advised of split billing requirements and indicates understanding: No    1. Physical exam  The patient is in a decline in her health to her memory.    2. Mild neurocognitive disorder  Patient presumably has underlying dementia, severe    3. Need for vaccination     - Pneumococcal polysaccharide vaccine 23-valent 1 yo or older, subq/IM    4. COVID-19 ruled out     - Asymptomatic COVID-19 Virus (CORONAVIRUS) PCR    PLAN:  1.  Pneumonia 23 vaccine.  2.  Covid PCR testing  3.  Paperwork filled out for nursing home admission.  4.  Patient to follow-up with her primary as needed.        Next follow up:  No follow-ups on file.    Immunization Review  Adult Imm Review: Due today, orders placed      I discussed the following with the patient:   Adult Healthy Living: Importance of regular exercise  Healthy nutrition        Subjective:   Chief Complaint: Roselyn Garcia is an 75 y.o. female here for a preventative health visit.     Patient has been advised of split billing requirements and indicates understanding: No     HPI: The patient is here with her , she has presumably underlying quite severe dementia, she is scheduled to be admitted into a memory care unit at a nursing facility however the patient herself does not appear to be aware of this, apparently she would become quite agitated and so this particular issue was not discussed.  The patient does need however paperwork filled out prior to admission to the nursing facility.    Technically she does need a pneumonia 23 booster, also she does need a negative Covid test, she has been vaccinated against Covid and is currently asymptomatic.    Otherwise no  "other change in the patient's health status, she does have a number of underlying comorbidities which have been stable.    Reviewed the patient's allergies medications active medical problems past medical history past surgical history family history and social history.      Healthy Habits  Are you taking a daily aspirin? Yes  Do you typically exercising at least 40 min, 3-4 times per week?  Yes  Do you usually eat at least 4 servings of fruit and vegetables a day, include whole grains and fiber and avoid regularly eating high fat foods? Yes  Have you had an eye exam in the past two years? Yes  Do you see a dentist twice per year? Yes  Do you have any concerns regarding sleep? No    Safety Screen  If you own firearms, are they secured in a locked gun cabinet or with trigger locks? NO  Do you feel you are safe where you are living?: Yes (11/19/2020 11:18 AM)  Do you feel you are safe in your relationship(s)?: Yes (11/19/2020 11:18 AM)      Review of Systems:  Please see above.  The rest of the review of systems are negative for all systems.     Pap History:   Last 3 PAP results:  No results found for: PAP  Cancer Screening     Patient has no health maintenance due at this time          Patient Care Team:  Niurka Austin MD as PCP - General  Niurka Austin MD as Assigned PCP        History     Reviewed By Date/Time Sections Reviewed    Lavon Boyd MD 4/30/2021 10:14 AM Surgical, Tobacco, Alcohol, Drug Use, Sexual Activity    Lavon Boyd MD 4/30/2021 10:09 AM Medical, Surgical, Sexual Activity, Family, Social Documentation, Socioeconomic, Lifestyle, Relationships    Gretchen Haynes, [facility] 4/30/2021 10:00 AM Tobacco            Objective:   Vital Signs:   Visit Vitals  /68 (Patient Site: Right Arm, Patient Position: Sitting, Cuff Size: Adult Regular)   Ht 5' 2\" (1.575 m)   Wt 124 lb 1.6 oz (56.3 kg)   LMP 07/29/1972   BMI 22.70 kg/m           PHYSICAL EXAM  Physical Exam   Constitutional: She " appears well-developed and well-nourished.   HENT:   Head: Normocephalic.   Eyes: Pupils are equal, round, and reactive to light.   Cardiovascular: Normal rate and regular rhythm.   Pulmonary/Chest: Effort normal and breath sounds normal.   Musculoskeletal: Normal range of motion.   Neurological:   Patient sat quietly and answered very few questions   Skin: Skin is warm and dry.   Psychiatric: She has a normal mood and affect. Her behavior is normal.   Difficult to ascertain judgment and orientation         The 10-year ASCVD risk score (Brittneyoctaviano GODOY Jr., et al., 2013) is: 15.6%    Values used to calculate the score:      Age: 75 years      Sex: Female      Is Non- : No      Diabetic: No      Tobacco smoker: No      Systolic Blood Pressure: 118 mmHg      Is BP treated: No      HDL Cholesterol: 66 mg/dL      Total Cholesterol: 203 mg/dL         Medication List          Accurate as of April 30, 2021 11:32 AM. If you have any questions, ask your nurse or doctor.            CONTINUE taking these medications    aspirin 81 MG EC tablet  INSTRUCTIONS: Take 81 mg by mouth 3 (three) times a week. Monday Wednesday Friday  Reason for med: Taking 3 times a week        calcium-vitamin D 500 mg(1,250mg) -200 unit per tablet  INSTRUCTIONS: Take 1 tablet by mouth 2 (two) times a day with meals.  Generic drug: calcium-vitamin D        citalopram 10 MG tablet  Also known as: celeXA  INSTRUCTIONS: TAKE 1-1.5 TABLET BY MOUTH DAILY AS DIRECTED. GENERIC EQUIVALENT FOR CELEXA        cyanocobalamin 1000 MCG tablet  INSTRUCTIONS: Take 1,000 mcg by mouth daily.        estradioL 1 MG tablet  Also known as: ESTRACE  INSTRUCTIONS: Take 1 tablet (1 mg total) by mouth daily.        folic acid 800 MCG tablet  Also known as: FOLVITE  INSTRUCTIONS: Take 800 mcg by mouth daily.        Lacto.acidophilus-Bif.animalis 32 billion cell Cap  INSTRUCTIONS: Take 1 capsule by mouth.        Lactobacillus rhamnosus GG 10-15 Billion cell  capsule  Also known as: CULTURELLE  INSTRUCTIONS: Take 1 capsule by mouth 2 (two) times a day.        levothyroxine 50 MCG tablet  Also known as: SYNTHROID, LEVOTHROID  INSTRUCTIONS: Take 1 tablet (50 mcg total) by mouth daily.        pravastatin 20 MG tablet  Also known as: PravachoL  INSTRUCTIONS: Take 1 tablet (20 mg total) by mouth every evening.        progesterone 100 MG capsule  Also known as: PROMETRIUM  INSTRUCTIONS: Take 1 capsule (100 mg total) by mouth at bedtime.        risperiDONE 1 MG tablet  Also known as: RisperDAL  INSTRUCTIONS: risperidone 1 mg tablet   TAKE 1 TABLET BY MOUTH TWICE DAILY        SUMAtriptan 25 MG tablet  Also known as: IMITREX  INSTRUCTIONS: Take 1 tablet (25 mg total) by mouth every 2 (two) hours as needed for migraine.        vit N81-pxzaeht fact-FA cmb #2 500- mcg-mg-mcg Tab  INSTRUCTIONS: Intrinsi B12-Folate 500 mcg-20 mg-800 mcg tablet   Take by oral route.        vitamin B-6 100 MG tablet  INSTRUCTIONS: Take 200 mg by mouth daily.  Generic drug: pyridoxine (vitamin B6)           STOP taking these medications    ondansetron 4 MG disintegrating tablet  Also known as: ZOFRAN-ODT  Stopped by: Lavon Boyd MD            Additional Screenings Completed Today:

## 2021-07-03 NOTE — ADDENDUM NOTE
Addendum Note by Najma Patel CMA at 12/21/2018 11:59 PM     Author: Najma Patel CMA Service: -- Author Type: Certified Medical Assistant    Filed: 12/27/2018 10:40 AM Date of Service: 12/21/2018 11:59 PM Status: Signed    : Najma Patel CMA (Certified Medical Assistant)    Encounter addended by: Najma Patel CMA on: 12/27/2018 10:40 AM      Actions taken: Charge Capture section accepted

## 2021-07-03 NOTE — ADDENDUM NOTE
Addendum Note by Najma Patel CMA at 9/12/2019 11:59 PM     Author: Najma Patel CMA Service: -- Author Type: Certified Medical Assistant    Filed: 9/16/2019  2:48 PM Date of Service: 9/12/2019 11:59 PM Status: Signed    : Najma Patel CMA (Certified Medical Assistant)    Encounter addended by: Najma Patel CMA on: 9/16/2019  2:48 PM      Actions taken: Charge Capture section accepted

## 2021-07-03 NOTE — ADDENDUM NOTE
Addendum Note by Najma Patel CMA at 12/12/2018 11:59 PM     Author: Najma Patel CMA Service: -- Author Type: Certified Medical Assistant    Filed: 1/8/2019  9:44 AM Date of Service: 12/12/2018 11:59 PM Status: Signed    : Najma Patel CMA (Certified Medical Assistant)    Encounter addended by: Najma Patel CMA on: 1/8/2019  9:44 AM      Actions taken: Charge Capture section accepted

## 2021-07-03 NOTE — ADDENDUM NOTE
Addendum Note by Najma Patel CMA at 4/29/2019 11:59 PM     Author: Najma Patel CMA Service: -- Author Type: Certified Medical Assistant    Filed: 4/30/2019  2:28 PM Date of Service: 4/29/2019 11:59 PM Status: Signed    : Najma Patel CMA (Certified Medical Assistant)    Encounter addended by: Najma Patel CMA on: 4/30/2019  2:28 PM      Actions taken: Charge Capture section accepted

## 2021-07-03 NOTE — ADDENDUM NOTE
Addendum Note by Caity Garner CMA at 5/9/2019 11:59 PM     Author: Caity Garner CMA Service: -- Author Type: Certified Medical Assistant    Filed: 6/12/2019 11:09 AM Date of Service: 5/9/2019 11:59 PM Status: Signed    : Caity Garner CMA (Certified Medical Assistant)    Encounter addended by: Caity Garner CMA on: 6/12/2019 11:09 AM      Actions taken: Charge Capture section accepted

## 2021-07-04 NOTE — TELEPHONE ENCOUNTER
Telephone Encounter by Courtney Price RN at 6/3/2021  9:32 AM     Author: Courtney Price RN Service: -- Author Type: Registered Nurse    Filed: 6/3/2021  9:35 AM Encounter Date: 6/2/2021 Status: Signed    : Courtney Price RN (Registered Nurse)       Juli Durand, RN  to Rizwana Cha RN          8:58 AM  JESSICA Cueva sent me a message as well, I called the  and left a message, I can let him know that remote checks are fine!   Thanks,   juli   June 2, 2021  Mo Forrester MD  to Rizwana Cha RN ? [facility] Ep Rn Support Pool          10:04 PM  Lucille Wagoner for the message.  There is no significant problem based on the chest x-ray so remote follow-up was fine.  Thanks   Mo

## 2021-07-04 NOTE — TELEPHONE ENCOUNTER
"Telephone Encounter by Juli Durand, RN at 6/3/2021  8:50 AM     Author: Juli Durand RN Service: -- Author Type: Registered Nurse    Filed: 6/3/2021  8:51 AM Encounter Date: 6/1/2021 Status: Signed    : Juli Durand RN (Registered Nurse)       Mo Forrester MD  You 10 hours ago (10:03 PM)     Juli  I did review the patient's chest x-ray.  The patient's generator has \"uncoiled\" I have to turn but there is no problem and no issues with the leads.  There is no need for the patient to come in the clinic.  Routine device follow-up.   Thanks   Mo             "

## 2021-07-07 ENCOUNTER — COMMUNICATION - HEALTHEAST (OUTPATIENT)
Dept: FAMILY MEDICINE | Facility: CLINIC | Age: 76
End: 2021-07-07

## 2021-07-07 DIAGNOSIS — G31.84 MILD NEUROCOGNITIVE DISORDER: ICD-10-CM

## 2021-07-07 NOTE — TELEPHONE ENCOUNTER
Telephone Encounter by Jeb Burgess, RN at 7/7/2021  2:50 PM     Author: Jeb Burgess, RN Service: -- Author Type: Registered Nurse    Filed: 7/7/2021  2:50 PM Encounter Date: 7/7/2021 Status: Signed    : Jeb Burgess, RN (Registered Nurse)       RN cannot approve Refill Request    RN can NOT refill this medication med is not covered by policy/route to provider and historical medication requested. Last office visit: 2/18/2021 Niurka Austin MD Last Physical: 10/12/2020 Last MTM visit: Visit date not found Last visit same specialty: 4/30/2021 Lavon Boyd MD.  Next visit within 3 mo: Visit date not found  Next physical within 3 mo: Visit date not found      Jeb Burgess, [facility] Connection Triage/Med Refill 7/7/2021    Requested Prescriptions   Pending Prescriptions Disp Refills   ? folic acid (FOLVITE) 800 MCG tablet [Pharmacy Med Name: FOLIC ACID 0.8 MG TAB] 30 tablet 0     Sig: TAKE 1 TABLET BY MOUTH ONCE DAILY       There is no refill protocol information for this order

## 2021-07-11 ENCOUNTER — LAB REQUISITION (OUTPATIENT)
Dept: LAB | Facility: CLINIC | Age: 76
End: 2021-07-11
Payer: COMMERCIAL

## 2021-07-11 DIAGNOSIS — D52.9 FOLATE DEFICIENCY ANEMIA, UNSPECIFIED: ICD-10-CM

## 2021-07-13 ENCOUNTER — RECORDS - HEALTHEAST (OUTPATIENT)
Dept: ADMINISTRATIVE | Facility: CLINIC | Age: 76
End: 2021-07-13

## 2021-07-14 DIAGNOSIS — Z78.0 MENOPAUSE: Primary | ICD-10-CM

## 2021-07-14 PROBLEM — J45.909 ASTHMA: Status: RESOLVED | Noted: 2020-11-19 | Resolved: 2021-04-30

## 2021-07-15 RX ORDER — ESTRADIOL 1 MG/1
TABLET ORAL
Qty: 30 TABLET | Refills: 11 | Status: SHIPPED | OUTPATIENT
Start: 2021-07-15 | End: 2022-01-01

## 2021-07-20 ENCOUNTER — LAB REQUISITION (OUTPATIENT)
Dept: LAB | Facility: CLINIC | Age: 76
End: 2021-07-20
Payer: COMMERCIAL

## 2021-07-20 DIAGNOSIS — N39.0 URINARY TRACT INFECTION, SITE NOT SPECIFIED: ICD-10-CM

## 2021-07-20 LAB
ALBUMIN UR-MCNC: NEGATIVE MG/DL
APPEARANCE UR: CLEAR
BILIRUB UR QL STRIP: NEGATIVE
COLOR UR AUTO: COLORLESS
GLUCOSE UR STRIP-MCNC: NEGATIVE MG/DL
HGB UR QL STRIP: NEGATIVE
KETONES UR STRIP-MCNC: NEGATIVE MG/DL
LEUKOCYTE ESTERASE UR QL STRIP: NEGATIVE
NITRATE UR QL: NEGATIVE
PH UR STRIP: 7 [PH] (ref 5–7)
SP GR UR STRIP: 1.01 (ref 1–1.03)
UROBILINOGEN UR STRIP-MCNC: <2 MG/DL

## 2021-07-20 PROCEDURE — 87086 URINE CULTURE/COLONY COUNT: CPT | Mod: ORL

## 2021-07-20 PROCEDURE — 81003 URINALYSIS AUTO W/O SCOPE: CPT | Mod: ORL | Performed by: FAMILY MEDICINE

## 2021-07-20 PROCEDURE — 87086 URINE CULTURE/COLONY COUNT: CPT | Mod: ORL | Performed by: FAMILY MEDICINE

## 2021-07-20 PROCEDURE — 81003 URINALYSIS AUTO W/O SCOPE: CPT | Mod: ORL

## 2021-07-21 ENCOUNTER — RECORDS - HEALTHEAST (OUTPATIENT)
Dept: ADMINISTRATIVE | Facility: CLINIC | Age: 76
End: 2021-07-21

## 2021-07-21 LAB — BACTERIA UR CULT: NO GROWTH

## 2021-07-27 NOTE — TELEPHONE ENCOUNTER
Refilled 10/12/20 with 3 refills.    progesterone (PROMETRIUM) 100 MG capsule 90 capsule 3 10/12/2020  No   Sig - Route: Take 1 capsule (100 mg total) by mouth at bedtime. - Oral   Sent to pharmacy as: progesterone micronized 100 mg capsule (PROMETRIUM)   E-Prescribing Status: Receipt confirmed by pharmacy (10/12/2020  9:21 AM CDT)

## 2021-08-06 NOTE — PATIENT INSTRUCTIONS - HE
Patient Instructions by Niurka Austin MD at 10/12/2020  9:10 AM     Author: Niurka Austin MD Service: -- Author Type: Physician    Filed: 10/12/2020  8:52 AM Encounter Date: 10/12/2020 Status: Signed    : Niurka Austin MD (Physician)         Patient Education     Your Health Risk Assessment indicates you feel you are not in good physical health.    A healthy lifestyle helps keep the body fit and the mind alert. It helps protect you from disease, helps you fight disease, and helps prevent chronic disease (disease that doesn't go away) from getting worse. This is important as you get older and begin to notice twinges in muscles and joints and a decline in the strength and stamina you once took for granted. A healthy lifestyle includes good healthcare, good nutrition, weight control, recreation, and regular exercise. Avoid harmful substances and do what you can to keep safe. Another part of a healthy lifestyle is stay mentally active and socially involved.    Good healthcare     Have a wellness visit every year.     If you have new symptoms, let us know right away. Don't wait until the next checkup.     Take medicines exactly as prescribed and keep your medicines in a safe place. Tell us if your medicine causes problems.   Healthy diet and weight control     Eat 3 or 4 small, nutritious, low-fat, high-fiber meals a day. Include a variety of fruits, vegetables, and whole-grain foods.     Make sure you get enough calcium in your diet. Calcium, vitamin D, and exercise help prevent osteoporosis (bone thinning).     If you live alone, try eating with others when you can. That way you get a good meal and have company while you eat it.     Try to keep a healthy weight. If you eat more calories than your body uses for energy, it will be stored as fat and you will gain weight.     Recreation   Recreation is not limited to sports and team events. It includes any activity that provides relaxation, interest,  enjoyment, and exercise. Recreation provides an outlet for physical, mental, and social energy. It can give a sense of worth and achievement. It can help you stay healthy.       Patient Education     Exercise for a Healthier Heart  You may wonder how you can improve the health of your heart. If youre thinking about exercise, youre on the right track. You dont need to become an athlete, but you do need a certain amount of brisk exercise to help strengthen your heart. If you have been diagnosed with a heart condition, your doctor may recommend exercise to help stabilize your condition. To help make exercise a habit, choose safe, fun activities.       Be sure to check with your health care provider before starting an exercise program.    Why exercise?  Exercising regularly offers many healthy rewards. It can help you do all of the following:    Improve your blood cholesterol levels to help prevent further heart trouble    Lower your blood pressure to help prevent a stroke or heart attack    Control diabetes, or reduce your risk of getting this disease    Improve your heart and lung function    Reach and maintain a healthy weight    Make your muscles stronger and more limber so you can stay active    Prevent falls and fractures by slowing the loss of bone mass (osteoporosis)    Manage stress better  Exercise tips  Ease into your routine. Set small goals. Then build on them.  Exercise on most days. Aim for a total of 150 or more minutes of moderate to  vigorous intensity activity each week. Consider 40 minutes, 3 to 4 times a week. For best results, activity should last for 40 minutes on average. It is OK to work up to the 40 minute period over time. Examples of moderate-intensity activity is walking one mile in 15 minutes or 30 to 45 minutes of yard work.  Step up your daily activity level. Along with your exercise program, try being more active throughout the day. Walk instead of drive. Do more household tasks or yard  work.  Choose one or more activities you enjoy. Walking is one of the easiest things you can do. You can also try swimming, riding a bike, or taking an exercise class.  Stop exercising and call your doctor if you:    Have chest pain or feel dizzy or lightheaded    Feel burning, tightness, pressure, or heaviness in your chest, neck, shoulders, back, or arms    Have unusual shortness of breath    Have increased joint or muscle pain    Have palpitations or an irregular heartbeat      1782-6010 [employer]. [address] [place] [address]. All rights reserved. This information is not intended as a substitute for professional medical care. Always follow your healthcare professional's instructions.         Patient Education   Instrumental Activities of Daily Living  Your Health Risk Assessment indicates you have difficulties with instrumental activities of daily living which include laundry, housekeeping, banking, shopping, using the telephone, food preparation, transportation, or taking your own medications. Please make a follow up appointment for us to address this issue in more detail.    [employer] has resources available on the following website: [email or web address]     Also, here is a local agency that provides help with meals and other assistance:   [facility] Line: 583.553.6124     Patient Education   Signs of Hearing Loss  Hearing loss is a problem shared by many people. In fact, it is one of the most common health conditions, particularly as people age. Most people over age 65 have some hearing loss, and by age 80, almost everyone does. Because hearing loss usually occurs slowly over the years, you may not realize your hearing ability has gotten worse.       Have your hearing checked  Contact your [facility] care provider if you:    Have to strain to hear normal conversation.    Have to watch other peoples faces very carefully to follow what theyre saying.    Need to  ask people to repeat what theyve said.    Often misunderstand what people are saying.    Turn the volume of the television or radio up so high that others complain.    Feel that people are mumbling when theyre talking to you.    Find that the effort to hear leaves you feeling tired and irritated.    Notice, when using the phone, that you hear better with 1 ear than the other.    9973-4777 [employer]. [address] [place] [address], [place] [address]. All rights reserved. This information is not intended as a substitute for professional medical care. Always follow your healthcare professional's instructions.         Patient Education   Urinary Incontinence, Female (Adult)  Urinary incontinence means loss of control of the bladder. This problem affects many women, especially as they get older. If you have incontinence, you may be embarrassed to ask for help. But know that this problem can be treated.  Types of Incontinence  There are different types of incontinence. Two of the main types are described here. You can have more than one type.    Stress incontinence. With this type, urine leaks when pressure (stress) is put on the bladder. This may happen when you cough, sneeze, or laugh. Stress incontinence most often occurs because the pelvic floor muscles that support the bladder and urethra are weak. This can happen after pregnancy and vaginal childbirth or a hysterectomy. It can also be due to excess body weight or hormone changes.    Urge incontinence (also called overactive bladder). With this type, a sudden urge to urinate is felt often. This may happen even though there may not be much urine in the bladder. The need to urinate often during the night is common. Urge incontinence most often occurs because of bladder spasms. This may be due to bladder irritation or infection. Damage to bladder nerves or pelvic muscles, constipation, and certain medicines can also lead to urge incontinence.  Treatment of  urinary incontinence depends on the cause. Further evaluation is needed to find the type you have. This will likely include an exam and certain tests. Based on the results, you and your healthcare provider can then plan treatment. Until a diagnosis is made, the home care tips below can help relieve symptoms.  Home care    Do pelvic floor muscle exercises, if they are prescribed. The pelvic floor muscles help support the bladder and urethra. Many women find that their symptoms improve when doing special exercises that strengthen these muscles. To do the exercises contract the muscles you would use to stop your stream of urine, but do this when youre not urinating. Hold for 10 seconds, then relax. Repeat 10 to 20 times in a row, at least 3 times a day. Your provider may give you other instructions for how to do the exercises and how often.    Keep a bladder diary. This helps track how often and how much you urinate over a set period of time. Bring this diary with you to your next visit with the provider. The information can help your provider learn more about your bladder problem.    Lose weight, if advised to by your provider. Excess weight puts pressure on the bladder. Your provider can help you create a weight-loss plan thats right for you. This may include exercising more and making certain diet changes.    Don't consume foods and drinks that may irritate the bladder. These can include alcohol and caffeinated drinks.    Quit smoking. Smoking and other tobacco use can lead to chronic cough that strains the pelvic floor muscles. Smoking may also damage the bladder and urethra. Talk with your provider about treatments or methods you can use to quit smoking.    If drinking large amounts of fluid causes you to have symptoms, you may be advised to limit your fluid intake. You may also be advised to drink most of your fluids during the day and to limit fluids at night.    If youre worried about urine leakage or  accidents, you may wear absorbent pads to catch urine. Change the pads often. This helps reduce discomfort. It may also reduce the risk of skin or bladder infections.  Follow-up care  Follow up with your healthcare provider, or as directed. It may take some to find the right treatment for your problem. Your treatment plan may include special therapies or medicines. Certain procedures or surgery may also be options. Be sure to discuss any questions you have with your provider.  When to seek medical advice  Call the healthcare provider right away if any of these occur:    Fever of 100.4 F (38 C) or higher, or as directed by your provider    Bladder pain or fullness    Abdominal swelling    Nausea or vomiting    Back pain    Weakness, dizziness or fainting  Date Last Reviewed: 10/1/2017    5002-6712 [employer]. [address] [place] [address]. All rights reserved. This information is not intended as a substitute for professional medical care. Always follow your healthcare professional's instructions.     Patient Education   Your Health Risk Assessment indicates you feel you are not in good emotional health.    Recreation   Recreation is not limited to sports and team events. It includes any activity that provides relaxation, interest, enjoyment, and exercise. Recreation provides an outlet for physical, mental, and social energy. It can give a sense of worth and achievement. It can help you stay healthy.    Mental Exercise and Social Involvement  Mental and emotional health is as important as physical health. Keep in touch with friends and family. Stay as active as possible. Continue to learn and challenge yourself.   Things you can do to stay mentally active are:    Learn something new, like a foreign language or musical instrument.     Play SCRABBLE or do crossword puzzles. If you cannot find people to play these games with you at home, you can play them with others on your computer through  the Internet.     Join a games club--anything from card games to chess or checkers or lawn bowling.     Start a new hobby.     Go back to school.     Volunteer.     Read.     Keep up with world events.       Patient Education   Depression and Suicide in Older Adults  Nearly 2 million older Americans have some type of depression. Sadly, some of them even take their own lives. Yet depression among older adults is often ignored. Learn the warning signs. You may help spare a loved one needless pain. You may also save a life.       What Is Depression?  Depression is a mood disorder that affects the way you think and feel. The most common symptom is a feeling of deep sadness. People who are depressed also may seem tired and listless. And nothing seems to give them pleasure. Its normal to grieve or be sad sometimes. But sadness lessens or passes with time. Depression rarely goes away or improves on its own. Other symptoms of depression are:    Sleeping more or less than normal    Eating more or less than normal    Having headaches, stomachaches, or other pains that dont go away    Feeling nervous, empty, or worthless    Crying a great deal    Thinking or talking about suicide or death    Feeling confused or forgetful  What Causes It?  The causes of depression arent fully known. Certain chemicals in the brain play a role. Depression does run in families. And life stresses can also trigger depression in some people. That may be the case with older adults. They often face great burdens, such as the death of friends or a spouse. They may have failing health. And they are more likely to be alone, lonely, or poor.  How You Can Help  Often, depressed people may not want to ask for help. When they do, they may be ignored. Or, they may receive the wrong treatment. You can help by showing parents and older friends love and support. If they seem depressed, help them find the right treatment. Talk to your doctor. Or contact a local  mental health center, social service agency, or hospital. With modern treatment, no one has to suffer from depression.  Resources:    National [place] of Mental Health  899.313.8941  www.nimh.nih.gov    National [place] on Mental Illness  109.420.4183  www.lula.org    [facility]  298.421.5808  www.nmha.org    National Suicide Hotline  230.722.7131 (800-SUICIDE)      2394-7237 [employer]. [address] [place] [address]. All rights reserved. This information is not intended as a substitute for professional medical care. Always follow your healthcare professional's instructions.           Advance Directive  Patients advance directive was discussed and I am comfortable with the patients wishes.  Patient Education   Personalized Prevention Plan  You are due for the preventive services outlined below.  Your care team is available to assist you in scheduling these services.  If you have already completed any of these items, please share that information with your care team to update in your medical record.  Health Maintenance   Topic Date Due   ? DEPRESSION ACTION PLAN  1945   ? Pneumococcal Vaccine: 65+ Years (2 of 2 - PPSV23) 08/19/2016   ? DXA SCAN  09/14/2018   ? INFLUENZA VACCINE RULE BASED (1) 08/01/2020   ? ZOSTER VACCINES (3 of 3) 10/16/2019   ? MEDICARE ANNUAL WELLNESS VISIT  10/12/2021   ? FALL RISK ASSESSMENT  10/12/2021   ? LIPID  09/30/2024   ? ADVANCE CARE PLANNING  10/12/2025   ? TD 18+ HE  08/21/2029   ? Pneumococcal Vaccine: Pediatrics (0 to 5 Years) and At-Risk Patients (6 to 64 Years)  Aged Out   ? HEPATITIS B VACCINES  Aged Out

## 2021-09-18 NOTE — TELEPHONE ENCOUNTER
"Routing refill request to provider for review/approval because:  Drug not active on patient's medication list  No established PCP      Last office visit provider:   4/30/21    Requested Prescriptions   Pending Prescriptions Disp Refills     levothyroxine (SYNTHROID/LEVOTHROID) 50 MCG tablet [Pharmacy Med Name: LEVOTHYROXINE 50MCG TAB] 28 tablet 11     Sig: TAKE 1 TABLET BY MOUTH ONCE DAILY       Thyroid Protocol Failed - 9/17/2021  4:01 PM        Failed - Medication is active on med list        Passed - Patient is 12 years or older        Passed - Recent (12 mo) or future (30 days) visit within the authorizing provider's specialty     Patient has had an office visit with the authorizing provider or a provider within the authorizing providers department within the previous 12 mos or has a future within next 30 days. See \"Patient Info\" tab in inbasket, or \"Choose Columns\" in Meds & Orders section of the refill encounter.              Passed - Normal TSH on file in past 12 months     Recent Labs   Lab Test 06/15/21  0600   TSH 4.42              Passed - No active pregnancy on record     If patient is pregnant or has had a positive pregnancy test, please check TSH.          Passed - No positive pregnancy test in past 12 months     If patient is pregnant or has had a positive pregnancy test, please check TSH.               zachary cabrera RN 09/18/21 1:15 PM  "

## 2021-10-10 PROBLEM — R00.0 TACHYCARDIA: Status: ACTIVE | Noted: 2021-01-01

## 2021-10-10 NOTE — ED TRIAGE NOTES
Patient is here with her  from a memory care. He stated that the staff took her heart rate and it was in the low 100 and she has been very anxious. She is not able to voice her needs. She was dx with dementia in 2018.

## 2021-10-10 NOTE — PROGRESS NOTES
Patient presents with:  Agitation: lives at [facility] says heart rate is bouncing around and is so shaky and hot sweating        Subjective     Roselyn Garcia is a 76 year old female who presents to clinic today for the following health issues:    HPI       Shaking/heart racing       Duration: onset of symptoms earlier today         Patient is a 76-year-old female with Alzheimer's disease, hypertension, sick sinus syndrome with cardiac pacemaker and anxiety,  resides at [facility] in [place] presents to urgent care with her .  Her  said  that staff reported patient was feeling shaky/anxious. When these symptoms occur, she will receive clonazepam and  symptoms typically resolve.. Patient received her clonazepam and then additional second dose since the symptoms did not resolve. After observing patient  for 2 to 3 hours she continued to feel very shaky, felt like her heart was beating too fast and feeling hot and sweaty.  Memory care staff directed  to take patient to urgent care. At present, she denies chest pain/pressure  or shortness of breath.  She could not sit still and  had to keep walking in the exam room,  feeling very overwhelmed and shaky/anxious.   said that she does not typically have episodes like this that do not resolve with clonazepam.  He said the sensation of feeling hot and her sweating is not typical as well.  She denies nausea, dysuria, and she does have some incontinence of urine at baseline.  Hands are visibly shaking, and per  she does not have chronic shaking of her hands.               No past medical history on file.  Social History     Tobacco Use     Smoking status: Never Smoker     Smokeless tobacco: Never Used   Substance Use Topics     Alcohol use: No       Current Outpatient Medications   Medication Sig Dispense Refill     calcium carbonate-vitamin D (OYSTER SHELL CALCIUM/D) 500-200 MG-UNIT tablet TAKE 1 TABLET BY MOUTH TWICE DAILY WITH  MEALS       citalopram (CELEXA) 20 MG tablet citalopram 20 mg tablet       clonazePAM (KLONOPIN) 0.5 MG tablet        estradiol (ESTRACE) 1 MG tablet TAKE 1 TABLET BY MOUTH ONCE DAILY 30 tablet 11     gabapentin (NEURONTIN) 100 MG capsule Take 100 mg by mouth 3 times daily       levothyroxine (SYNTHROID/LEVOTHROID) 50 MCG tablet TAKE 1 TABLET BY MOUTH ONCE DAILY 28 tablet 11     progesterone (PROMETRIUM) 100 MG capsule TAKE 1 CAPSULE BY MOUTH AT BEDTIME 30 capsule 3     QUEtiapine (SEROQUEL) 50 MG tablet Take 50 mg by mouth 2 times daily       levothyroxine (SYNTHROID/LEVOTHROID) 50 MCG tablet  (Patient not taking: Reported on 10/10/2021)       levothyroxine (SYNTHROID/LEVOTHROID) 50 MCG tablet Take 50 mcg by mouth daily (Patient not taking: Reported on 10/10/2021)       No Known Allergies          ROS are negative, except as otherwise noted HPI      Objective    BP (!) 141/83   Pulse 85   Temp 97.8  F (36.6  C) (Oral)   Resp 18   Wt 56.2 kg (124 lb)   SpO2 98%   BMI 22.68 kg/m    Body mass index is 22.68 kg/m .  Physical Exam   GENERAL: Anxious and in obvious distress  RESP: lungs clear to auscultation - no rales, rhonchi or wheezes  CV: Mild tachycardia  and regular rhythm, normal S1 S2, no S3 or S4, no murmur, click or rub,  MS: no gross musculoskeletal defects noted, trace to 1+ pitting edema bilateral   NEURO: Alert oriented to self, anxious, pacing in the room, hands shaking bilateral   Slightly more confused not at baseline per spouse, steady gait when pacing in the room      Diagnostic Test Results:  Labs reviewed in Epic        ASSESSMENT/PLAN:      ICD-10-CM    1. Altered mental status, unspecified altered mental status type  R41.82    2. Alzheimer's dementia with behavioral disturbance, unspecified timing of dementia onset (H)  G30.9     F02.81    3. Tachycardia  R00.0    4. Anxiety  F41.9    5. Sick sinus syndrome (H)  I49.5    6. Cardiac pacemaker in situ  Z95.0      To [facility] for further  evaluation of altered mental status shaking/tremors, mild tachycardia, feeling hot/sweaty.  will transport patient via car to the ER.  Discussed with Dr. Lord and accepted patient in transfer.       The use of Dragon/Bolt HR dictation services may have been used to construct the content in this note; any grammatical or spelling errors are non-intentional. Please contact the author of this note directly if you are in need of any clarification.        On the day of the encounter, time spend on chart review, patient visit, review of testing, documentation and discussion with other providers was 30 minutes        Patient Instructions     To [facility] ER for feeling shaky, hot, heart racing and not getting better after your clonazpam at the memory unit and you need a more extensive evaluation     Cheli Benitez MD

## 2021-10-10 NOTE — ED NOTES
Expected Patient Referral to ED  5:04 PM    Referring Clinic/Provider:  [facility]  Dr. Cheli eBnitez ([phone])    Reason for referral/Clinical facts:  77 yo F from Memory care for CC anxiety not reponsive to intranasal clonazepam  Shaky, c/o hot all over, confused, heart racing, rate in 90s-low 100s, appears to be regular. VSS otherwise.  Hx Sick sinus syndrome s/p pacemaker placement, HL, hypothyroid, neurocognitive impairment.   Coming via private vehicle with  for further medical eval.     Recommendations provided:  Caller was informed that this institution does  possess the capabilities and/or resources to provide for patient and should be transferred to our institution.    Informed caller that recommendations provided are recommendations based only on the facts provided and that they responsible to accept or reject the advice, or to seek a formal in person consultation as needed and that this ED will see/treat patient should they arrive.      Alina Lord MD  Emergency Medicine  [facility] EMERGENCY ROOM  [address] [place] [id number] [phone]       Alina Lord MD  10/10/21 7888

## 2021-10-10 NOTE — ED PROVIDER NOTES
EMERGENCY DEPARTMENT ENCOUNTER      NAME: Roselyn Garcia  AGE: 76 year old female  YOB: 1945  MRN: [id number]  EVALUATION DATE & TIME: 10/10/2021  6:49 PM    PCP: Niurka Austin    ED PROVIDER: Lam Albarran PA-C      Chief Complaint   Patient presents with     Anxiety     Tachycardia         FINAL IMPRESSION:  1. Benzodiazepine withdrawal (H)          MEDICAL DECISION MAKING:    Pertinent Labs & Imaging studies reviewed. (See chart for details)  76 year old female presents to the Emergency Department for evaluation of symptoms appearing consistent with anxiety and shaking.    After obtaining history of present illness which was mainly from  at the bedside, reviewing vitals and briefly examining the patient I did further screen with urinalysis, x-ray, EKG, screening labs.  Overall my clinical suspicion is actually quite high that her symptoms are related to her recent psychiatric medication changes.  She has been changed from risperidone to Seroquel in the last 48 to 72 hours and in addition to that she has had significantly less clinic clonazepam than she is used to.  Typically she is taking 3 dosages a day and over the last few days it has only been once per day.    I did treat her with an extra dose of clonazepam here.    The work-up here is essentially unremarkable.  She is resting peacefully and does not appear in any acute distress.  I recommended that they discuss further medication changes with her primary care, or those who are managing her psychiatric medications hopefully to wean down from the benzodiazepines at a slower pace.        ED COURSE    7:10 PM I met with the patient, obtained history, performed an initial exam, and discussed options and plan for diagnostics and treatment here in the ED.    At the conclusion of the encounter I discussed the results of all of the tests and the disposition. The questions were answered. The patient or family acknowledged understanding and  was agreeable with the care plan.     MEDICATIONS GIVEN IN THE EMERGENCY:  Medications   clonazePAM (klonoPIN) tablet 1 mg (1 mg Oral Given 10/10/21 2033)       NEW PRESCRIPTIONS STARTED AT TODAY'S ER VISIT  New Prescriptions    No medications on file            =================================================================    HPI    Patient information was obtained from:  at bedside    Use of Interpretor: N/A         Roselyn BRAYDEN Garcia is a 76 year old female with a pertinent history of cardiac pacemaker in situ, dementia with psychosis, sensorineural hearing loss, hypercholesterolemia, hypothyroidism, who presents to this ED via walk-in for evaluation of anxiety and tachycardia.     reports that patient is here from the memory care, the patient has been experiencing symptoms since 12 or 12:30 PM. The symptoms include nervousness, feeling warm, shaky, and a higher heart rate. He denies the patient having a cough, vomiting, fever. He endorses a lot of diarrhea in the past few weeks, though she has had normal bowel movements in the last day or so.  reports that the patient had been taking risperidone until recently, she was switched to Seroquel on Thursday. She has also been taking clonazepam, and recently had the dosage decreased from 2-3 times per day to 1 a day.  denies any other current complaints.    Patient denies pain or feeling off.      REVIEW OF SYSTEMS   Review of Systems   Constitutional: Negative for fever.   Respiratory: Negative for cough.    Cardiovascular: Positive for palpitations.   Gastrointestinal: Positive for diarrhea. Negative for vomiting.   Neurological: Positive for tremors.   Psychiatric/Behavioral: The patient is nervous/anxious.    All other systems reviewed and are negative.        PAST MEDICAL HISTORY:  No past medical history on file.    PAST SURGICAL HISTORY:  Past Surgical History:   Procedure Laterality Date     BREAST EXCISIONAL BIOPSY Right     While  ago     C APPENDECTOMY      Description: Appendectomy;  Recorded: 10/08/2008;     C BREAST SURGERY PROCEDURE UNLISTED      Description: Breast Surgery;  Recorded: 10/08/2008;     EP PACEMAKER INSERT Left 5/31/2020    Procedure: EP Pacemaker Insertion;  Surgeon: Mo Forrester MD;  Location: [facility];  Service: Cardiology     HC BLEPHAROPLASTY UPPER LID W EXCESS SKIN      Description: Blepharoplasty Upper Lid W/ Excessive Skin;  Recorded: 08/02/2013;     HC REMOVAL OF TONSILS,<13 Y/O      Description: Tonsillectomy;  Recorded: 10/08/2008;     HYSTERECTOMY  1972     NE VAGINAL HYSTERECTOMY,UTERUS 250 GMS/<      Description: Vaginal Hysterectomy;  Recorded: 08/19/2013;  Comments: prolapse.....has ovaries     NE VAGINAL HYSTERECTOMY,UTERUS 250 GMS/<      Description: Vaginal Hysterectomy;  Recorded: 08/15/2014;  Comments: prolapse.....has ovaries     SACRAL NERVE STIMULATOR PLACEMENT  07/31/2013    Dr Campo         CURRENT MEDICATIONS:    No current facility-administered medications for this encounter.    Current Outpatient Medications:      calcium carbonate-vitamin D (OYSTER SHELL CALCIUM/D) 500-200 MG-UNIT tablet, TAKE 1 TABLET BY MOUTH TWICE DAILY WITH MEALS, Disp: , Rfl:      citalopram (CELEXA) 20 MG tablet, citalopram 20 mg tablet, Disp: , Rfl:      clonazePAM (KLONOPIN) 0.5 MG tablet, , Disp: , Rfl:      estradiol (ESTRACE) 1 MG tablet, TAKE 1 TABLET BY MOUTH ONCE DAILY, Disp: 30 tablet, Rfl: 11     gabapentin (NEURONTIN) 100 MG capsule, Take 100 mg by mouth 3 times daily, Disp: , Rfl:      levothyroxine (SYNTHROID/LEVOTHROID) 50 MCG tablet, TAKE 1 TABLET BY MOUTH ONCE DAILY, Disp: 28 tablet, Rfl: 11     levothyroxine (SYNTHROID/LEVOTHROID) 50 MCG tablet, , Disp: , Rfl:      levothyroxine (SYNTHROID/LEVOTHROID) 50 MCG tablet, Take 50 mcg by mouth daily (Patient not taking: Reported on 10/10/2021), Disp: , Rfl:      progesterone (PROMETRIUM) 100 MG capsule, TAKE 1 CAPSULE BY MOUTH AT BEDTIME, Disp:  30 capsule, Rfl: 3     QUEtiapine (SEROQUEL) 50 MG tablet, Take 50 mg by mouth 2 times daily, Disp: , Rfl:       ALLERGIES:  No Known Allergies    FAMILY HISTORY:  No family history on file.    SOCIAL HISTORY:   Social History     Socioeconomic History     Marital status:      Spouse name: Not on file     Number of children: 2     Years of education: Not on file     Highest education level: Not on file   Occupational History     Not on file   Tobacco Use     Smoking status: Never Smoker     Smokeless tobacco: Never Used   Substance and Sexual Activity     Alcohol use: No     Drug use: No     Sexual activity: Yes     Partners: Male     Birth control/protection: Post-menopausal   Other Topics Concern     Not on file   Social History Narrative    [facility]     Social Determinants of Health     Financial Resource Strain:      Difficulty of Paying Living Expenses:    Food Insecurity:      Worried About Running Out of Food in the Last Year:      Ran Out of Food in the Last Year:    Transportation Needs:      Lack of Transportation (Medical):      Lack of Transportation (Non-Medical):    Physical Activity:      Days of Exercise per Week:      Minutes of Exercise per Session:    Stress:      Feeling of Stress :    Social Connections:      Frequency of Communication with Friends and Family:      Frequency of Social Gatherings with Friends and Family:      Attends [employer] Services:      Active Member of Clubs or Organizations:      Attends Club or Organization Meetings:      Marital Status:    Intimate Partner Violence:      Fear of Current or Ex-Partner:      Emotionally Abused:      Physically Abused:      Sexually Abused:        VITALS:  Patient Vitals for the past 24 hrs:   BP Temp Pulse Resp SpO2 Weight   10/10/21 2030 116/78 -- 69 27 95 % --   10/10/21 2000 134/65 -- 79 18 97 % --   10/10/21 1853 (!) 175/98 98.1  F (36.7  C) 100 20 97 % --   10/10/21 1731 137/85 97.7  F (36.5  C) 85 16 96 % 56.2 kg (124 lb)        PHYSICAL EXAM    Physical Exam  Vitals and nursing note reviewed.   Constitutional:       General: She is not in acute distress.     Appearance: She is normal weight. She is not toxic-appearing.   HENT:      Head: Normocephalic.      Nose: Nose normal.   Eyes:      Conjunctiva/sclera: Conjunctivae normal.      Pupils: Pupils are equal, round, and reactive to light.   Cardiovascular:      Pulses: Normal pulses.   Pulmonary:      Effort: Pulmonary effort is normal. No respiratory distress.      Breath sounds: No wheezing.   Musculoskeletal:         General: No tenderness, deformity or signs of injury. Normal range of motion.      Cervical back: Normal range of motion.   Skin:     General: Skin is warm and dry.      Coloration: Skin is not pale.      Findings: No bruising.   Neurological:      General: No focal deficit present.      Mental Status: She is alert. Mental status is at baseline.      Sensory: No sensory deficit.      Motor: No weakness.      Comments: Mild generalized body shaking is noted.   Psychiatric:      Comments: Patient has significant dementia but is reported to be at her baseline per .          LAB:  All pertinent labs reviewed and interpreted.  Results for orders placed or performed during the hospital encounter of 10/10/21   XR Chest Port 1 View    Impression    IMPRESSION: Lungs are clear. No pleural effusion or pneumothorax. Normal heart size. No subclavian approach dual-chamber pacer.       UA with Microscopic reflex to Culture    Specimen: Urine, Midstream   Result Value Ref Range    Color Urine Colorless Colorless, Straw, Light Yellow, Yellow    Appearance Urine Clear Clear    Glucose Urine Negative Negative mg/dL    Bilirubin Urine Negative Negative    Ketones Urine Negative Negative mg/dL    Specific Gravity Urine 1.013 1.001 - 1.030    Blood Urine Negative Negative    pH Urine 7.5 (H) 5.0 - 7.0    Protein Albumin Urine Negative Negative mg/dL    Urobilinogen Urine <2.0 <2.0  mg/dL    Nitrite Urine Negative Negative    Leukocyte Esterase Urine Negative Negative    Mucus Urine Present (A) None Seen /LPF    RBC Urine <1 <=2 /HPF    WBC Urine <1 <=5 /HPF    Squamous Epithelials Urine 1 <=1 /HPF   Comprehensive metabolic panel   Result Value Ref Range    Sodium 139 136 - 145 mmol/L    Potassium 4.1 3.5 - 5.0 mmol/L    Chloride 106 98 - 107 mmol/L    Carbon Dioxide (CO2) 21 (L) 22 - 31 mmol/L    Anion Gap 12 5 - 18 mmol/L    Urea Nitrogen 15 8 - 28 mg/dL    Creatinine 1.01 0.60 - 1.10 mg/dL    Calcium 9.7 8.5 - 10.5 mg/dL    Glucose 91 70 - 125 mg/dL    Alkaline Phosphatase 59 45 - 120 U/L    AST 18 0 - 40 U/L    ALT 14 0 - 45 U/L    Protein Total 7.1 6.0 - 8.0 g/dL    Albumin 3.7 3.5 - 5.0 g/dL    Bilirubin Total 0.4 0.0 - 1.0 mg/dL    GFR Estimate 54 (L) >60 mL/min/1.73m2   CBC with platelets and differential   Result Value Ref Range    WBC Count 7.0 4.0 - 11.0 10e3/uL    RBC Count 4.43 3.80 - 5.20 10e6/uL    Hemoglobin 13.4 11.7 - 15.7 g/dL    Hematocrit 39.9 35.0 - 47.0 %    MCV 90 78 - 100 fL    MCH 30.2 26.5 - 33.0 pg    MCHC 33.6 31.5 - 36.5 g/dL    RDW 12.6 10.0 - 15.0 %    Platelet Count 330 150 - 450 10e3/uL    % Neutrophils 57 %    % Lymphocytes 30 %    % Monocytes 8 %    % Eosinophils 4 %    % Basophils 1 %    % Immature Granulocytes 0 %    NRBCs per 100 WBC 0 <1 /100    Absolute Neutrophils 4.0 1.6 - 8.3 10e3/uL    Absolute Lymphocytes 2.1 0.8 - 5.3 10e3/uL    Absolute Monocytes 0.6 0.0 - 1.3 10e3/uL    Absolute Eosinophils 0.3 0.0 - 0.7 10e3/uL    Absolute Basophils 0.1 0.0 - 0.2 10e3/uL    Absolute Immature Granulocytes 0.0 <=0.0 10e3/uL    Absolute NRBCs 0.0 10e3/uL   ECG 12-LEAD WITH MUSE (LHE)   Result Value Ref Range    Systolic Blood Pressure  mmHg    Diastolic Blood Pressure  mmHg    Ventricular Rate 81 BPM    Atrial Rate 81 BPM    IA Interval 146 ms    QRS Duration 74 ms     ms    QTc 441 ms    P Axis 58 degrees    R AXIS 20 degrees    T Axis 51 degrees     Interpretation ECG       Sinus rhythm  Normal ECG  When compared with ECG of 31-MAY-2021 17:34,  No significant change was found  Confirmed by SEE ED PROVIDER NOTE FOR, ECG INTERPRETATION ([id number]),  CHUY TRIVEDI ([id number]) on 10/10/2021 7:20:54 PM         RADIOLOGY:  Reviewed all pertinent imaging. Please see official radiology report.  XR Chest Port 1 View   Final Result   IMPRESSION: Lungs are clear. No pleural effusion or pneumothorax. Normal heart size. No subclavian approach dual-chamber pacer.                EKG:    Performed at: 1738    The EKG showing a sinus rhythm at a rate of 81 bpm.  The ST segments are normal with no acute elevation or depression.  T waves are upright and normal.  QTC measured at 441.  Large amount of artifact as the patient is continuously shaking.    I have independently reviewed and interpreted the EKG(s) documented above.            I, Apoorva Tellez, am serving as a scribe to document services personally performed by Lam Albarran PA-C based on my observation and the provider's statements to me. I, Lam Albarran PA-C attest that Apoorva Tellez is acting in a scribe capacity, has observed my performance of the services and has documented them in accordance with my direction.    Lam Albarran PA-C  Emergency Medicine  [facility]     Lam Albarran PA-C  10/10/21 2111

## 2021-10-10 NOTE — PATIENT INSTRUCTIONS
To Jessica JUNG for feeling shaky, hot, heart racing and not getting better after your clonazpam at the memory unit and you need a more extensive evaluation     Cheli Benitez MD

## 2021-10-11 NOTE — DISCHARGE INSTRUCTIONS
I believe that your symptoms are related to your medication changes that have occurred in the last 48 to 72 hours.  Please attempt to wean from your benzodiazepines at a slower pace.

## 2021-11-02 NOTE — TELEPHONE ENCOUNTER
"Routing refill request to provider for review/approval because:  Early refill request.  Clarify PCP in EPIC    Last Written Prescription Date:  8/16/21  Last Fill Quantity: 30,  # refills: 3   Last office visit provider:  4/30/21     Requested Prescriptions   Pending Prescriptions Disp Refills     progesterone (PROMETRIUM) 100 MG capsule [Pharmacy Med Name: PROGESTERONE 100 MG CAP] 28 capsule 11     Sig: TAKE 1 CAPSULE BY MOUTH AT BEDTIME       Hormone Replacement Therapy Passed - 11/1/2021 12:55 PM        Passed - Blood pressure under 140/90 in past 12 months     BP Readings from Last 3 Encounters:   10/10/21 128/67   10/10/21 (!) 141/83   04/30/21 118/68                 Passed - Recent (12 mo) or future (30 days) visit within the authorizing provider's specialty     Patient has had an office visit with the authorizing provider or a provider within the authorizing providers department within the previous 12 mos or has a future within next 30 days. See \"Patient Info\" tab in inbasket, or \"Choose Columns\" in Meds & Orders section of the refill encounter.              Passed - Medication is active on med list        Passed - Patient is 18 years of age or older        Passed - No active pregnancy on record        Passed - No positive pregnancy test on record in past 12 months             Zuly Tubbs RN 11/02/21 3:13 PM  "

## 2021-11-25 NOTE — TELEPHONE ENCOUNTER
"Refill request too soon.  Sent note to pharmacy to use Rx written on 8/16/21.        Requested Prescriptions   Pending Prescriptions Disp Refills     progesterone (PROMETRIUM) 100 MG capsule [Pharmacy Med Name: PROGESTERONE 100 MG CAP] 28 capsule 11     Sig: TAKE 1 CAPSULE BY MOUTH AT BEDTIME       Hormone Replacement Therapy Passed - 11/23/2021 10:18 AM        Passed - Blood pressure under 140/90 in past 12 months     BP Readings from Last 3 Encounters:   10/10/21 128/67   10/10/21 (!) 141/83   04/30/21 118/68                 Passed - Recent (12 mo) or future (30 days) visit within the authorizing provider's specialty     Patient has had an office visit with the authorizing provider or a provider within the authorizing providers department within the previous 12 mos or has a future within next 30 days. See \"Patient Info\" tab in inbasket, or \"Choose Columns\" in Meds & Orders section of the refill encounter.              Passed - Medication is active on med list        Passed - Patient is 18 years of age or older        Passed - No active pregnancy on record        Passed - No positive pregnancy test on record in past 12 months             Lulu Avery RN 11/25/21 6:19 AM  "

## 2022-01-01 ENCOUNTER — LAB REQUISITION (OUTPATIENT)
Dept: LAB | Facility: CLINIC | Age: 77
End: 2022-01-01
Payer: COMMERCIAL

## 2022-01-01 ENCOUNTER — ANCILLARY PROCEDURE (OUTPATIENT)
Dept: CARDIOLOGY | Facility: CLINIC | Age: 77
End: 2022-01-01
Attending: INTERNAL MEDICINE
Payer: COMMERCIAL

## 2022-01-01 ENCOUNTER — HEALTH MAINTENANCE LETTER (OUTPATIENT)
Age: 77
End: 2022-01-01

## 2022-01-01 DIAGNOSIS — R30.0 DYSURIA: ICD-10-CM

## 2022-01-01 DIAGNOSIS — I10 ESSENTIAL (PRIMARY) HYPERTENSION: ICD-10-CM

## 2022-01-01 DIAGNOSIS — N39.0 URINARY TRACT INFECTION, SITE NOT SPECIFIED: ICD-10-CM

## 2022-01-01 DIAGNOSIS — Z95.0 PACEMAKER: ICD-10-CM

## 2022-01-01 DIAGNOSIS — E03.9 HYPOTHYROIDISM, UNSPECIFIED: ICD-10-CM

## 2022-01-01 DIAGNOSIS — Z20.822 CONTACT WITH AND (SUSPECTED) EXPOSURE TO COVID-19: ICD-10-CM

## 2022-01-01 DIAGNOSIS — Z78.0 MENOPAUSE: ICD-10-CM

## 2022-01-01 DIAGNOSIS — I49.5 SICK SINUS SYNDROME (H): ICD-10-CM

## 2022-01-01 LAB
ALBUMIN UR-MCNC: 10 MG/DL
ALBUMIN UR-MCNC: 20 MG/DL
ALBUMIN UR-MCNC: NEGATIVE MG/DL
ALBUMIN UR-MCNC: NEGATIVE MG/DL
AMORPH CRY #/AREA URNS HPF: ABNORMAL /HPF
ANION GAP SERPL CALCULATED.3IONS-SCNC: 10 MMOL/L (ref 5–18)
APPEARANCE UR: ABNORMAL
APPEARANCE UR: ABNORMAL
APPEARANCE UR: CLEAR
APPEARANCE UR: CLEAR
BACTERIA #/AREA URNS HPF: ABNORMAL /HPF
BACTERIA #/AREA URNS HPF: ABNORMAL /HPF
BACTERIA UR CULT: ABNORMAL
BACTERIA UR CULT: ABNORMAL
BACTERIA UR CULT: NO GROWTH
BACTERIA UR CULT: NORMAL
BASOPHILS # BLD AUTO: 0.1 10E3/UL (ref 0–0.2)
BASOPHILS NFR BLD AUTO: 1 %
BILIRUB UR QL STRIP: NEGATIVE
BUN SERPL-MCNC: 14 MG/DL (ref 8–28)
CALCIUM SERPL-MCNC: 9.5 MG/DL (ref 8.5–10.5)
CAOX CRY #/AREA URNS HPF: ABNORMAL /HPF
CHLORIDE BLD-SCNC: 104 MMOL/L (ref 98–107)
CO2 SERPL-SCNC: 24 MMOL/L (ref 22–31)
COLOR UR AUTO: ABNORMAL
COLOR UR AUTO: COLORLESS
COLOR UR AUTO: YELLOW
COLOR UR AUTO: YELLOW
CREAT SERPL-MCNC: 0.8 MG/DL (ref 0.6–1.1)
EOSINOPHIL # BLD AUTO: 0.2 10E3/UL (ref 0–0.7)
EOSINOPHIL NFR BLD AUTO: 2 %
ERYTHROCYTE [DISTWIDTH] IN BLOOD BY AUTOMATED COUNT: 12.8 % (ref 10–15)
GFR SERPL CREATININE-BSD FRML MDRD: 75 ML/MIN/1.73M2
GLUCOSE BLD-MCNC: 74 MG/DL (ref 70–125)
GLUCOSE UR STRIP-MCNC: NEGATIVE MG/DL
HCT VFR BLD AUTO: 38.7 % (ref 35–47)
HGB BLD-MCNC: 12.8 G/DL (ref 11.7–15.7)
HGB UR QL STRIP: NEGATIVE
IMM GRANULOCYTES # BLD: 0 10E3/UL
IMM GRANULOCYTES NFR BLD: 1 %
KETONES UR STRIP-MCNC: NEGATIVE MG/DL
LEUKOCYTE ESTERASE UR QL STRIP: ABNORMAL
LEUKOCYTE ESTERASE UR QL STRIP: ABNORMAL
LEUKOCYTE ESTERASE UR QL STRIP: NEGATIVE
LEUKOCYTE ESTERASE UR QL STRIP: NEGATIVE
LYMPHOCYTES # BLD AUTO: 1.3 10E3/UL (ref 0.8–5.3)
LYMPHOCYTES NFR BLD AUTO: 21 %
MCH RBC QN AUTO: 30 PG (ref 26.5–33)
MCHC RBC AUTO-ENTMCNC: 33.1 G/DL (ref 31.5–36.5)
MCV RBC AUTO: 91 FL (ref 78–100)
MDC_IDC_EPISODE_DTM: NORMAL
MDC_IDC_EPISODE_DURATION: 0 S
MDC_IDC_EPISODE_DURATION: 1 S
MDC_IDC_EPISODE_DURATION: 101 S
MDC_IDC_EPISODE_DURATION: 108 S
MDC_IDC_EPISODE_DURATION: 109 S
MDC_IDC_EPISODE_DURATION: 11 S
MDC_IDC_EPISODE_DURATION: 11 S
MDC_IDC_EPISODE_DURATION: 13 S
MDC_IDC_EPISODE_DURATION: 14 S
MDC_IDC_EPISODE_DURATION: 14 S
MDC_IDC_EPISODE_DURATION: 144 S
MDC_IDC_EPISODE_DURATION: 15 S
MDC_IDC_EPISODE_DURATION: 17 S
MDC_IDC_EPISODE_DURATION: 17 S
MDC_IDC_EPISODE_DURATION: 175 S
MDC_IDC_EPISODE_DURATION: 179 S
MDC_IDC_EPISODE_DURATION: 2 S
MDC_IDC_EPISODE_DURATION: 20 S
MDC_IDC_EPISODE_DURATION: 215 S
MDC_IDC_EPISODE_DURATION: 22 S
MDC_IDC_EPISODE_DURATION: 23 S
MDC_IDC_EPISODE_DURATION: 24 S
MDC_IDC_EPISODE_DURATION: 25 S
MDC_IDC_EPISODE_DURATION: 3 S
MDC_IDC_EPISODE_DURATION: 3 S
MDC_IDC_EPISODE_DURATION: 30 S
MDC_IDC_EPISODE_DURATION: 30 S
MDC_IDC_EPISODE_DURATION: 32 S
MDC_IDC_EPISODE_DURATION: 33 S
MDC_IDC_EPISODE_DURATION: 334 S
MDC_IDC_EPISODE_DURATION: 34 S
MDC_IDC_EPISODE_DURATION: 35 S
MDC_IDC_EPISODE_DURATION: 36 S
MDC_IDC_EPISODE_DURATION: 36 S
MDC_IDC_EPISODE_DURATION: 37 S
MDC_IDC_EPISODE_DURATION: 39 S
MDC_IDC_EPISODE_DURATION: 41 S
MDC_IDC_EPISODE_DURATION: 42 S
MDC_IDC_EPISODE_DURATION: 42 S
MDC_IDC_EPISODE_DURATION: 45 S
MDC_IDC_EPISODE_DURATION: 46 S
MDC_IDC_EPISODE_DURATION: 5 S
MDC_IDC_EPISODE_DURATION: 50 S
MDC_IDC_EPISODE_DURATION: 52 S
MDC_IDC_EPISODE_DURATION: 55 S
MDC_IDC_EPISODE_DURATION: 59 S
MDC_IDC_EPISODE_DURATION: 6 S
MDC_IDC_EPISODE_DURATION: 78 S
MDC_IDC_EPISODE_DURATION: 8 S
MDC_IDC_EPISODE_DURATION: 80 S
MDC_IDC_EPISODE_ID: 100
MDC_IDC_EPISODE_ID: 101
MDC_IDC_EPISODE_ID: 102
MDC_IDC_EPISODE_ID: 103
MDC_IDC_EPISODE_ID: 104
MDC_IDC_EPISODE_ID: 106
MDC_IDC_EPISODE_ID: 107
MDC_IDC_EPISODE_ID: 108
MDC_IDC_EPISODE_ID: 109
MDC_IDC_EPISODE_ID: 110
MDC_IDC_EPISODE_ID: 111
MDC_IDC_EPISODE_ID: 112
MDC_IDC_EPISODE_ID: 113
MDC_IDC_EPISODE_ID: 115
MDC_IDC_EPISODE_ID: 116
MDC_IDC_EPISODE_ID: 117
MDC_IDC_EPISODE_ID: 118
MDC_IDC_EPISODE_ID: 119
MDC_IDC_EPISODE_ID: 120
MDC_IDC_EPISODE_ID: 131
MDC_IDC_EPISODE_ID: 132
MDC_IDC_EPISODE_ID: 133
MDC_IDC_EPISODE_ID: 142
MDC_IDC_EPISODE_ID: 143
MDC_IDC_EPISODE_ID: 144
MDC_IDC_EPISODE_ID: 145
MDC_IDC_EPISODE_ID: 146
MDC_IDC_EPISODE_ID: 147
MDC_IDC_EPISODE_ID: 148
MDC_IDC_EPISODE_ID: 149
MDC_IDC_EPISODE_ID: 150
MDC_IDC_EPISODE_ID: 151
MDC_IDC_EPISODE_ID: 152
MDC_IDC_EPISODE_ID: 153
MDC_IDC_EPISODE_ID: 154
MDC_IDC_EPISODE_ID: 155
MDC_IDC_EPISODE_ID: 156
MDC_IDC_EPISODE_ID: 157
MDC_IDC_EPISODE_ID: 158
MDC_IDC_EPISODE_ID: 159
MDC_IDC_EPISODE_ID: 160
MDC_IDC_EPISODE_ID: 161
MDC_IDC_EPISODE_ID: 162
MDC_IDC_EPISODE_ID: 163
MDC_IDC_EPISODE_ID: 164
MDC_IDC_EPISODE_ID: 165
MDC_IDC_EPISODE_ID: 166
MDC_IDC_EPISODE_ID: 167
MDC_IDC_EPISODE_ID: 168
MDC_IDC_EPISODE_ID: 169
MDC_IDC_EPISODE_ID: 17
MDC_IDC_EPISODE_ID: 170
MDC_IDC_EPISODE_ID: 171
MDC_IDC_EPISODE_ID: 172
MDC_IDC_EPISODE_ID: 173
MDC_IDC_EPISODE_ID: 174
MDC_IDC_EPISODE_ID: 175
MDC_IDC_EPISODE_ID: 176
MDC_IDC_EPISODE_ID: 177
MDC_IDC_EPISODE_ID: 178
MDC_IDC_EPISODE_ID: 179
MDC_IDC_EPISODE_ID: 180
MDC_IDC_EPISODE_ID: 181
MDC_IDC_EPISODE_ID: 182
MDC_IDC_EPISODE_ID: 183
MDC_IDC_EPISODE_ID: 184
MDC_IDC_EPISODE_ID: 94
MDC_IDC_EPISODE_ID: 95
MDC_IDC_EPISODE_ID: 96
MDC_IDC_EPISODE_ID: 97
MDC_IDC_EPISODE_ID: 98
MDC_IDC_EPISODE_ID: 99
MDC_IDC_EPISODE_TYPE: NORMAL
MDC_IDC_LEAD_IMPLANT_DT: NORMAL
MDC_IDC_LEAD_LOCATION: NORMAL
MDC_IDC_LEAD_LOCATION_DETAIL_1: NORMAL
MDC_IDC_LEAD_MFG: NORMAL
MDC_IDC_LEAD_MODEL: NORMAL
MDC_IDC_LEAD_POLARITY_TYPE: NORMAL
MDC_IDC_LEAD_SERIAL: NORMAL
MDC_IDC_LEAD_SPECIAL_FUNCTION: NORMAL
MDC_IDC_MSMT_BATTERY_DTM: NORMAL
MDC_IDC_MSMT_BATTERY_DTM: NORMAL
MDC_IDC_MSMT_BATTERY_REMAINING_LONGEVITY: 158 MO
MDC_IDC_MSMT_BATTERY_REMAINING_LONGEVITY: 161 MO
MDC_IDC_MSMT_BATTERY_RRT_TRIGGER: 2.62
MDC_IDC_MSMT_BATTERY_RRT_TRIGGER: 2.62
MDC_IDC_MSMT_BATTERY_STATUS: NORMAL
MDC_IDC_MSMT_BATTERY_STATUS: NORMAL
MDC_IDC_MSMT_BATTERY_VOLTAGE: 3.04 V
MDC_IDC_MSMT_BATTERY_VOLTAGE: 3.04 V
MDC_IDC_MSMT_LEADCHNL_RA_IMPEDANCE_VALUE: 342 OHM
MDC_IDC_MSMT_LEADCHNL_RA_IMPEDANCE_VALUE: 380 OHM
MDC_IDC_MSMT_LEADCHNL_RA_IMPEDANCE_VALUE: 418 OHM
MDC_IDC_MSMT_LEADCHNL_RA_IMPEDANCE_VALUE: 437 OHM
MDC_IDC_MSMT_LEADCHNL_RA_PACING_THRESHOLD_AMPLITUDE: 0.62 V
MDC_IDC_MSMT_LEADCHNL_RA_PACING_THRESHOLD_AMPLITUDE: 0.75 V
MDC_IDC_MSMT_LEADCHNL_RA_PACING_THRESHOLD_PULSEWIDTH: 0.4 MS
MDC_IDC_MSMT_LEADCHNL_RA_PACING_THRESHOLD_PULSEWIDTH: 0.4 MS
MDC_IDC_MSMT_LEADCHNL_RA_SENSING_INTR_AMPL: 2.12 MV
MDC_IDC_MSMT_LEADCHNL_RA_SENSING_INTR_AMPL: 2.12 MV
MDC_IDC_MSMT_LEADCHNL_RA_SENSING_INTR_AMPL: 3.25 MV
MDC_IDC_MSMT_LEADCHNL_RA_SENSING_INTR_AMPL: 3.25 MV
MDC_IDC_MSMT_LEADCHNL_RV_IMPEDANCE_VALUE: 380 OHM
MDC_IDC_MSMT_LEADCHNL_RV_IMPEDANCE_VALUE: 399 OHM
MDC_IDC_MSMT_LEADCHNL_RV_IMPEDANCE_VALUE: 437 OHM
MDC_IDC_MSMT_LEADCHNL_RV_IMPEDANCE_VALUE: 475 OHM
MDC_IDC_MSMT_LEADCHNL_RV_PACING_THRESHOLD_AMPLITUDE: 0.62 V
MDC_IDC_MSMT_LEADCHNL_RV_PACING_THRESHOLD_AMPLITUDE: 0.75 V
MDC_IDC_MSMT_LEADCHNL_RV_PACING_THRESHOLD_PULSEWIDTH: 0.4 MS
MDC_IDC_MSMT_LEADCHNL_RV_PACING_THRESHOLD_PULSEWIDTH: 0.4 MS
MDC_IDC_MSMT_LEADCHNL_RV_SENSING_INTR_AMPL: 15 MV
MDC_IDC_MSMT_LEADCHNL_RV_SENSING_INTR_AMPL: 15 MV
MDC_IDC_MSMT_LEADCHNL_RV_SENSING_INTR_AMPL: 17 MV
MDC_IDC_MSMT_LEADCHNL_RV_SENSING_INTR_AMPL: 17 MV
MDC_IDC_PG_IMPLANT_DTM: NORMAL
MDC_IDC_PG_IMPLANT_DTM: NORMAL
MDC_IDC_PG_MFG: NORMAL
MDC_IDC_PG_MFG: NORMAL
MDC_IDC_PG_MODEL: NORMAL
MDC_IDC_PG_MODEL: NORMAL
MDC_IDC_PG_SERIAL: NORMAL
MDC_IDC_PG_SERIAL: NORMAL
MDC_IDC_PG_TYPE: NORMAL
MDC_IDC_PG_TYPE: NORMAL
MDC_IDC_SESS_CLINIC_NAME: NORMAL
MDC_IDC_SESS_CLINIC_NAME: NORMAL
MDC_IDC_SESS_DTM: NORMAL
MDC_IDC_SESS_DTM: NORMAL
MDC_IDC_SESS_TYPE: NORMAL
MDC_IDC_SESS_TYPE: NORMAL
MDC_IDC_SET_BRADY_AT_MODE_SWITCH_RATE: 171 {BEATS}/MIN
MDC_IDC_SET_BRADY_AT_MODE_SWITCH_RATE: 171 {BEATS}/MIN
MDC_IDC_SET_BRADY_HYSTRATE: NORMAL
MDC_IDC_SET_BRADY_HYSTRATE: NORMAL
MDC_IDC_SET_BRADY_LOWRATE: 60 {BEATS}/MIN
MDC_IDC_SET_BRADY_LOWRATE: 60 {BEATS}/MIN
MDC_IDC_SET_BRADY_MAX_SENSOR_RATE: 130 {BEATS}/MIN
MDC_IDC_SET_BRADY_MAX_SENSOR_RATE: 130 {BEATS}/MIN
MDC_IDC_SET_BRADY_MAX_TRACKING_RATE: 130 {BEATS}/MIN
MDC_IDC_SET_BRADY_MAX_TRACKING_RATE: 130 {BEATS}/MIN
MDC_IDC_SET_BRADY_MODE: NORMAL
MDC_IDC_SET_BRADY_MODE: NORMAL
MDC_IDC_SET_BRADY_PAV_DELAY_LOW: 180 MS
MDC_IDC_SET_BRADY_PAV_DELAY_LOW: 180 MS
MDC_IDC_SET_BRADY_SAV_DELAY_LOW: 150 MS
MDC_IDC_SET_BRADY_SAV_DELAY_LOW: 150 MS
MDC_IDC_SET_LEADCHNL_RA_PACING_AMPLITUDE: 1.5 V
MDC_IDC_SET_LEADCHNL_RA_PACING_AMPLITUDE: 1.5 V
MDC_IDC_SET_LEADCHNL_RA_PACING_ANODE_ELECTRODE_1: NORMAL
MDC_IDC_SET_LEADCHNL_RA_PACING_ANODE_ELECTRODE_1: NORMAL
MDC_IDC_SET_LEADCHNL_RA_PACING_ANODE_LOCATION_1: NORMAL
MDC_IDC_SET_LEADCHNL_RA_PACING_ANODE_LOCATION_1: NORMAL
MDC_IDC_SET_LEADCHNL_RA_PACING_CAPTURE_MODE: NORMAL
MDC_IDC_SET_LEADCHNL_RA_PACING_CAPTURE_MODE: NORMAL
MDC_IDC_SET_LEADCHNL_RA_PACING_CATHODE_ELECTRODE_1: NORMAL
MDC_IDC_SET_LEADCHNL_RA_PACING_CATHODE_ELECTRODE_1: NORMAL
MDC_IDC_SET_LEADCHNL_RA_PACING_CATHODE_LOCATION_1: NORMAL
MDC_IDC_SET_LEADCHNL_RA_PACING_CATHODE_LOCATION_1: NORMAL
MDC_IDC_SET_LEADCHNL_RA_PACING_POLARITY: NORMAL
MDC_IDC_SET_LEADCHNL_RA_PACING_POLARITY: NORMAL
MDC_IDC_SET_LEADCHNL_RA_PACING_PULSEWIDTH: 0.4 MS
MDC_IDC_SET_LEADCHNL_RA_PACING_PULSEWIDTH: 0.4 MS
MDC_IDC_SET_LEADCHNL_RA_SENSING_ANODE_ELECTRODE_1: NORMAL
MDC_IDC_SET_LEADCHNL_RA_SENSING_ANODE_ELECTRODE_1: NORMAL
MDC_IDC_SET_LEADCHNL_RA_SENSING_ANODE_LOCATION_1: NORMAL
MDC_IDC_SET_LEADCHNL_RA_SENSING_ANODE_LOCATION_1: NORMAL
MDC_IDC_SET_LEADCHNL_RA_SENSING_CATHODE_ELECTRODE_1: NORMAL
MDC_IDC_SET_LEADCHNL_RA_SENSING_CATHODE_ELECTRODE_1: NORMAL
MDC_IDC_SET_LEADCHNL_RA_SENSING_CATHODE_LOCATION_1: NORMAL
MDC_IDC_SET_LEADCHNL_RA_SENSING_CATHODE_LOCATION_1: NORMAL
MDC_IDC_SET_LEADCHNL_RA_SENSING_POLARITY: NORMAL
MDC_IDC_SET_LEADCHNL_RA_SENSING_POLARITY: NORMAL
MDC_IDC_SET_LEADCHNL_RA_SENSING_SENSITIVITY: 0.3 MV
MDC_IDC_SET_LEADCHNL_RA_SENSING_SENSITIVITY: 0.3 MV
MDC_IDC_SET_LEADCHNL_RV_PACING_AMPLITUDE: 1.5 V
MDC_IDC_SET_LEADCHNL_RV_PACING_AMPLITUDE: 1.5 V
MDC_IDC_SET_LEADCHNL_RV_PACING_ANODE_ELECTRODE_1: NORMAL
MDC_IDC_SET_LEADCHNL_RV_PACING_ANODE_ELECTRODE_1: NORMAL
MDC_IDC_SET_LEADCHNL_RV_PACING_ANODE_LOCATION_1: NORMAL
MDC_IDC_SET_LEADCHNL_RV_PACING_ANODE_LOCATION_1: NORMAL
MDC_IDC_SET_LEADCHNL_RV_PACING_CAPTURE_MODE: NORMAL
MDC_IDC_SET_LEADCHNL_RV_PACING_CAPTURE_MODE: NORMAL
MDC_IDC_SET_LEADCHNL_RV_PACING_CATHODE_ELECTRODE_1: NORMAL
MDC_IDC_SET_LEADCHNL_RV_PACING_CATHODE_ELECTRODE_1: NORMAL
MDC_IDC_SET_LEADCHNL_RV_PACING_CATHODE_LOCATION_1: NORMAL
MDC_IDC_SET_LEADCHNL_RV_PACING_CATHODE_LOCATION_1: NORMAL
MDC_IDC_SET_LEADCHNL_RV_PACING_POLARITY: NORMAL
MDC_IDC_SET_LEADCHNL_RV_PACING_POLARITY: NORMAL
MDC_IDC_SET_LEADCHNL_RV_PACING_PULSEWIDTH: 0.4 MS
MDC_IDC_SET_LEADCHNL_RV_PACING_PULSEWIDTH: 0.4 MS
MDC_IDC_SET_LEADCHNL_RV_SENSING_ANODE_ELECTRODE_1: NORMAL
MDC_IDC_SET_LEADCHNL_RV_SENSING_ANODE_ELECTRODE_1: NORMAL
MDC_IDC_SET_LEADCHNL_RV_SENSING_ANODE_LOCATION_1: NORMAL
MDC_IDC_SET_LEADCHNL_RV_SENSING_ANODE_LOCATION_1: NORMAL
MDC_IDC_SET_LEADCHNL_RV_SENSING_CATHODE_ELECTRODE_1: NORMAL
MDC_IDC_SET_LEADCHNL_RV_SENSING_CATHODE_ELECTRODE_1: NORMAL
MDC_IDC_SET_LEADCHNL_RV_SENSING_CATHODE_LOCATION_1: NORMAL
MDC_IDC_SET_LEADCHNL_RV_SENSING_CATHODE_LOCATION_1: NORMAL
MDC_IDC_SET_LEADCHNL_RV_SENSING_POLARITY: NORMAL
MDC_IDC_SET_LEADCHNL_RV_SENSING_POLARITY: NORMAL
MDC_IDC_SET_LEADCHNL_RV_SENSING_SENSITIVITY: 0.9 MV
MDC_IDC_SET_LEADCHNL_RV_SENSING_SENSITIVITY: 0.9 MV
MDC_IDC_SET_ZONE_DETECTION_INTERVAL: 350 MS
MDC_IDC_SET_ZONE_DETECTION_INTERVAL: 350 MS
MDC_IDC_SET_ZONE_DETECTION_INTERVAL: 400 MS
MDC_IDC_SET_ZONE_DETECTION_INTERVAL: 400 MS
MDC_IDC_SET_ZONE_TYPE: NORMAL
MDC_IDC_STAT_AT_BURDEN_PERCENT: 0 %
MDC_IDC_STAT_AT_BURDEN_PERCENT: 0.8 %
MDC_IDC_STAT_AT_DTM_END: NORMAL
MDC_IDC_STAT_AT_DTM_END: NORMAL
MDC_IDC_STAT_AT_DTM_START: NORMAL
MDC_IDC_STAT_AT_DTM_START: NORMAL
MDC_IDC_STAT_BRADY_AP_VP_PERCENT: 0 %
MDC_IDC_STAT_BRADY_AP_VP_PERCENT: 0.83 %
MDC_IDC_STAT_BRADY_AP_VS_PERCENT: 1.13 %
MDC_IDC_STAT_BRADY_AP_VS_PERCENT: 2.79 %
MDC_IDC_STAT_BRADY_AS_VP_PERCENT: 0.03 %
MDC_IDC_STAT_BRADY_AS_VP_PERCENT: 0.45 %
MDC_IDC_STAT_BRADY_AS_VS_PERCENT: 95.94 %
MDC_IDC_STAT_BRADY_AS_VS_PERCENT: 98.84 %
MDC_IDC_STAT_BRADY_DTM_END: NORMAL
MDC_IDC_STAT_BRADY_DTM_END: NORMAL
MDC_IDC_STAT_BRADY_DTM_START: NORMAL
MDC_IDC_STAT_BRADY_DTM_START: NORMAL
MDC_IDC_STAT_BRADY_RA_PERCENT_PACED: 1.24 %
MDC_IDC_STAT_BRADY_RA_PERCENT_PACED: 3.99 %
MDC_IDC_STAT_BRADY_RV_PERCENT_PACED: 0.03 %
MDC_IDC_STAT_BRADY_RV_PERCENT_PACED: 1.62 %
MDC_IDC_STAT_EPISODE_RECENT_COUNT: 0
MDC_IDC_STAT_EPISODE_RECENT_COUNT: 2
MDC_IDC_STAT_EPISODE_RECENT_COUNT: 74
MDC_IDC_STAT_EPISODE_RECENT_COUNT: 87
MDC_IDC_STAT_EPISODE_RECENT_COUNT_DTM_END: NORMAL
MDC_IDC_STAT_EPISODE_RECENT_COUNT_DTM_START: NORMAL
MDC_IDC_STAT_EPISODE_TOTAL_COUNT: 0
MDC_IDC_STAT_EPISODE_TOTAL_COUNT: 88
MDC_IDC_STAT_EPISODE_TOTAL_COUNT: 90
MDC_IDC_STAT_EPISODE_TOTAL_COUNT_DTM_END: NORMAL
MDC_IDC_STAT_EPISODE_TOTAL_COUNT_DTM_START: NORMAL
MDC_IDC_STAT_EPISODE_TYPE: NORMAL
MONOCYTES # BLD AUTO: 0.3 10E3/UL (ref 0–1.3)
MONOCYTES NFR BLD AUTO: 6 %
MUCOUS THREADS #/AREA URNS LPF: PRESENT /LPF
MUCOUS THREADS #/AREA URNS LPF: PRESENT /LPF
NEUTROPHILS # BLD AUTO: 4.3 10E3/UL (ref 1.6–8.3)
NEUTROPHILS NFR BLD AUTO: 69 %
NITRATE UR QL: NEGATIVE
NRBC # BLD AUTO: 0 10E3/UL
NRBC BLD AUTO-RTO: 0 /100
PH UR STRIP: 5.5 [PH] (ref 5–7)
PH UR STRIP: 7 [PH] (ref 5–7)
PH UR STRIP: 7.5 [PH] (ref 5–7)
PH UR STRIP: 8 [PH] (ref 5–7)
PLATELET # BLD AUTO: 337 10E3/UL (ref 150–450)
POTASSIUM BLD-SCNC: 4.4 MMOL/L (ref 3.5–5)
RBC # BLD AUTO: 4.27 10E6/UL (ref 3.8–5.2)
RBC URINE: 0 /HPF
RBC URINE: 1 /HPF
SARS-COV-2 RNA RESP QL NAA+PROBE: NEGATIVE
SODIUM SERPL-SCNC: 138 MMOL/L (ref 136–145)
SP GR UR STRIP: 1 (ref 1–1.03)
SP GR UR STRIP: 1.01 (ref 1–1.03)
SP GR UR STRIP: 1.02 (ref 1–1.03)
SP GR UR STRIP: 1.02 (ref 1–1.03)
SQUAMOUS EPITHELIAL: 1 /HPF
SQUAMOUS EPITHELIAL: 1 /HPF
SQUAMOUS EPITHELIAL: 2 /HPF
SQUAMOUS EPITHELIAL: 6 /HPF
TRI-PHOS CRY #/AREA URNS HPF: ABNORMAL /HPF
TRI-PHOS CRY #/AREA URNS HPF: ABNORMAL /HPF
TSH SERPL DL<=0.005 MIU/L-ACNC: 3.8 UIU/ML (ref 0.3–5)
UROBILINOGEN UR STRIP-MCNC: <2 MG/DL
WBC # BLD AUTO: 6.2 10E3/UL (ref 4–11)
WBC URINE: 0 /HPF
WBC URINE: 16 /HPF
WBC URINE: 2 /HPF
WBC URINE: 8 /HPF

## 2022-01-01 PROCEDURE — P9604 ONE-WAY ALLOW PRORATED TRIP: HCPCS | Mod: ORL | Performed by: INTERNAL MEDICINE

## 2022-01-01 PROCEDURE — 93294 REM INTERROG EVL PM/LDLS PM: CPT | Performed by: INTERNAL MEDICINE

## 2022-01-01 PROCEDURE — 81001 URINALYSIS AUTO W/SCOPE: CPT | Mod: ORL | Performed by: INTERNAL MEDICINE

## 2022-01-01 PROCEDURE — U0003 INFECTIOUS AGENT DETECTION BY NUCLEIC ACID (DNA OR RNA); SEVERE ACUTE RESPIRATORY SYNDROME CORONAVIRUS 2 (SARS-COV-2) (CORONAVIRUS DISEASE [COVID-19]), AMPLIFIED PROBE TECHNIQUE, MAKING USE OF HIGH THROUGHPUT TECHNOLOGIES AS DESCRIBED BY CMS-2020-01-R: HCPCS | Mod: ORL | Performed by: INTERNAL MEDICINE

## 2022-01-01 PROCEDURE — 85025 COMPLETE CBC W/AUTO DIFF WBC: CPT | Mod: ORL | Performed by: INTERNAL MEDICINE

## 2022-01-01 PROCEDURE — U0005 INFEC AGEN DETEC AMPLI PROBE: HCPCS | Mod: ORL | Performed by: FAMILY MEDICINE

## 2022-01-01 PROCEDURE — 87088 URINE BACTERIA CULTURE: CPT | Mod: ORL | Performed by: INTERNAL MEDICINE

## 2022-01-01 PROCEDURE — 80048 BASIC METABOLIC PNL TOTAL CA: CPT | Mod: ORL | Performed by: INTERNAL MEDICINE

## 2022-01-01 PROCEDURE — 87086 URINE CULTURE/COLONY COUNT: CPT | Mod: ORL | Performed by: INTERNAL MEDICINE

## 2022-01-01 PROCEDURE — 93296 REM INTERROG EVL PM/IDS: CPT | Performed by: INTERNAL MEDICINE

## 2022-01-01 PROCEDURE — U0005 INFEC AGEN DETEC AMPLI PROBE: HCPCS | Mod: ORL | Performed by: INTERNAL MEDICINE

## 2022-01-01 PROCEDURE — 84443 ASSAY THYROID STIM HORMONE: CPT | Mod: ORL | Performed by: INTERNAL MEDICINE

## 2022-01-01 PROCEDURE — 36415 COLL VENOUS BLD VENIPUNCTURE: CPT | Mod: ORL | Performed by: INTERNAL MEDICINE

## 2022-01-01 PROCEDURE — 87088 URINE BACTERIA CULTURE: CPT | Mod: ORL

## 2022-01-01 PROCEDURE — 81001 URINALYSIS AUTO W/SCOPE: CPT | Mod: ORL

## 2022-01-01 RX ORDER — ESTRADIOL 1 MG/1
TABLET ORAL
Qty: 28 TABLET | Refills: 4 | Status: SHIPPED | OUTPATIENT
Start: 2022-01-01

## 2022-01-01 RX ORDER — ESTRADIOL 1 MG/1
TABLET ORAL
Qty: 28 TABLET | Refills: 11 | OUTPATIENT
Start: 2022-01-01

## 2022-01-01 RX ORDER — ESTRADIOL 1 MG/1
1 TABLET ORAL DAILY
Qty: 30 TABLET | Refills: 11 | OUTPATIENT
Start: 2022-01-01

## 2022-05-17 NOTE — TELEPHONE ENCOUNTER
Reason for Call:  Medication or medication refill:    Do you use a [facility]?  Name of the pharmacy and phone number for the current request:  [facility] [place], [place] - [address] [place] [address]  [phone]    Name of the medication requested: estradiol (ESTRACE) 1 MG tablet    Other request: none    Can we leave a detailed message on this number? Not Applicable    Phone number patient can be reached at: Home number on file [phone] (home)    Best Time: any    Call taken on 5/17/2022 at 11:49 AM by Juan Pablo Tierney

## 2022-05-29 NOTE — TELEPHONE ENCOUNTER
"Last Written Prescription Date:  7/15/21  Last Fill Quantity: 30,  # refills: 11   Last office visit provider:  10/10/21     Requested Prescriptions   Pending Prescriptions Disp Refills     estradiol (ESTRACE) 1 MG tablet [Pharmacy Med Name: ESTRADIOL 1MG TAB] 28 tablet 11     Sig: TAKE 1 TABLET BY MOUTH ONCE DAILY; OK TO CRUSH       Hormone Replacement Therapy Passed - 5/27/2022  3:07 PM        Passed - Blood pressure under 140/90 in past 12 months     BP Readings from Last 3 Encounters:   10/10/21 128/67   10/10/21 (!) 141/83   04/30/21 118/68                 Passed - Recent (12 mo) or future (30 days) visit within the authorizing provider's specialty     Patient has had an office visit with the authorizing provider or a provider within the authorizing providers department within the previous 12 mos or has a future within next 30 days. See \"Patient Info\" tab in inbasket, or \"Choose Columns\" in Meds & Orders section of the refill encounter.              Passed - Medication is active on med list        Passed - Patient is 18 years of age or older        Passed - No active pregnancy on record        Passed - No positive pregnancy test on record in past 12 months             Alexa Richmond RN 05/29/22 5:40 PM  "

## 2022-08-03 RX ORDER — LEVOTHYROXINE SODIUM 50 UG/1
TABLET ORAL
Qty: 28 TABLET | Refills: 11 | OUTPATIENT
Start: 2022-08-03

## 2022-08-03 NOTE — TELEPHONE ENCOUNTER
Requesting refill too early, should have medication to 9/20.     Sarina Fermin RN 08/03/22 4:20 PM   [facility] Triage Nurse Advisor